# Patient Record
Sex: FEMALE | Race: OTHER | Employment: OTHER | URBAN - METROPOLITAN AREA
[De-identification: names, ages, dates, MRNs, and addresses within clinical notes are randomized per-mention and may not be internally consistent; named-entity substitution may affect disease eponyms.]

---

## 2017-05-01 ENCOUNTER — HOSPITAL ENCOUNTER (OUTPATIENT)
Dept: OTHER | Age: 72
Discharge: HOME | End: 2017-05-01
Attending: NURSE PRACTITIONER | Admitting: NURSE PRACTITIONER

## 2017-05-01 DIAGNOSIS — M25.552 LEFT HIP PAIN: ICD-10-CM

## 2017-07-10 ENCOUNTER — HOSPITAL ENCOUNTER (OUTPATIENT)
Dept: MAMMOGRAPHY | Age: 72
Discharge: HOME | End: 2017-07-10

## 2017-07-10 DIAGNOSIS — Z12.31 VISIT FOR SCREENING MAMMOGRAM: ICD-10-CM

## 2017-08-08 ENCOUNTER — HOSPITAL ENCOUNTER (OUTPATIENT)
Dept: OTHER | Age: 72
Discharge: HOME | End: 2017-08-08
Attending: INTERNAL MEDICINE | Admitting: INTERNAL MEDICINE

## 2017-08-08 LAB
ALBUMIN SERPL-MCNC: 4.3 G/DL (ref 3.4–5)
ANION GAP SERPL CALCULATED.3IONS-SCNC: 15 MMOL/L (ref 3–16)
BUN BLDV-MCNC: 17 MG/DL (ref 7–20)
CALCIUM SERPL-MCNC: 9.8 MG/DL (ref 8.3–10.6)
CHLORIDE BLD-SCNC: 95 MMOL/L (ref 99–110)
CO2: 27 MMOL/L (ref 21–32)
CREAT SERPL-MCNC: 1.1 MG/DL (ref 0.6–1.2)
GFR AFRICAN AMERICAN: 59
GFR NON-AFRICAN AMERICAN: 49
GLUCOSE BLD-MCNC: 91 MG/DL (ref 70–99)
PHOSPHORUS: 4.2 MG/DL (ref 2.5–4.9)
POTASSIUM SERPL-SCNC: 4.1 MMOL/L (ref 3.5–5.1)
SODIUM BLD-SCNC: 137 MMOL/L (ref 136–145)

## 2017-08-29 ENCOUNTER — HOSPITAL ENCOUNTER (OUTPATIENT)
Dept: OTHER | Age: 72
Discharge: HOME | End: 2017-08-29
Attending: PODIATRIST | Admitting: PODIATRIST

## 2017-08-29 LAB
ALBUMIN SERPL-MCNC: 4.1 G/DL (ref 3.4–5)
ALP BLD-CCNC: 61 U/L (ref 40–129)
ALT SERPL-CCNC: 16 U/L (ref 10–40)
AST SERPL-CCNC: 18 U/L (ref 15–37)
BILIRUB SERPL-MCNC: <0.2 MG/DL (ref 0–1)
BILIRUBIN DIRECT: <0.2 MG/DL (ref 0–0.3)
BILIRUBIN, INDIRECT: NORMAL MG/DL (ref 0–1)
GAMMA GLUTAMYL TRANSFERASE: 48 U/L (ref 5–36)
TOTAL PROTEIN: 7.4 G/DL (ref 6.4–8.2)

## 2017-10-19 ENCOUNTER — OFFICE VISIT (OUTPATIENT)
Dept: CARDIOLOGY CLINIC | Age: 72
End: 2017-10-19

## 2017-10-19 VITALS
SYSTOLIC BLOOD PRESSURE: 128 MMHG | WEIGHT: 139 LBS | BODY MASS INDEX: 24.62 KG/M2 | DIASTOLIC BLOOD PRESSURE: 82 MMHG | HEART RATE: 51 BPM

## 2017-10-19 DIAGNOSIS — I47.1 PAT (PAROXYSMAL ATRIAL TACHYCARDIA) (HCC): Primary | Chronic | ICD-10-CM

## 2017-10-19 DIAGNOSIS — I10 ESSENTIAL HYPERTENSION: Chronic | ICD-10-CM

## 2017-10-19 PROCEDURE — G8399 PT W/DXA RESULTS DOCUMENT: HCPCS | Performed by: NURSE PRACTITIONER

## 2017-10-19 PROCEDURE — 4040F PNEUMOC VAC/ADMIN/RCVD: CPT | Performed by: NURSE PRACTITIONER

## 2017-10-19 PROCEDURE — G8427 DOCREV CUR MEDS BY ELIG CLIN: HCPCS | Performed by: NURSE PRACTITIONER

## 2017-10-19 PROCEDURE — G8420 CALC BMI NORM PARAMETERS: HCPCS | Performed by: NURSE PRACTITIONER

## 2017-10-19 PROCEDURE — 99213 OFFICE O/P EST LOW 20 MIN: CPT | Performed by: NURSE PRACTITIONER

## 2017-10-19 PROCEDURE — 93000 ELECTROCARDIOGRAM COMPLETE: CPT | Performed by: NURSE PRACTITIONER

## 2017-10-19 PROCEDURE — 1090F PRES/ABSN URINE INCON ASSESS: CPT | Performed by: NURSE PRACTITIONER

## 2017-10-19 PROCEDURE — 3017F COLORECTAL CA SCREEN DOC REV: CPT | Performed by: NURSE PRACTITIONER

## 2017-10-19 PROCEDURE — 3014F SCREEN MAMMO DOC REV: CPT | Performed by: NURSE PRACTITIONER

## 2017-10-19 PROCEDURE — G8484 FLU IMMUNIZE NO ADMIN: HCPCS | Performed by: NURSE PRACTITIONER

## 2017-10-19 PROCEDURE — 1123F ACP DISCUSS/DSCN MKR DOCD: CPT | Performed by: NURSE PRACTITIONER

## 2017-10-19 PROCEDURE — 1036F TOBACCO NON-USER: CPT | Performed by: NURSE PRACTITIONER

## 2017-10-19 RX ORDER — NADOLOL 20 MG/1
TABLET ORAL
Qty: 30 TABLET | Refills: 10 | OUTPATIENT
Start: 2017-10-19

## 2017-10-19 RX ORDER — ATORVASTATIN CALCIUM 20 MG/1
20 TABLET, FILM COATED ORAL DAILY
COMMUNITY

## 2017-10-19 RX ORDER — NADOLOL 20 MG/1
10 TABLET ORAL DAILY
Qty: 15 TABLET | Refills: 11 | Status: SHIPPED | OUTPATIENT
Start: 2017-10-19 | End: 2018-11-01 | Stop reason: ALTCHOICE

## 2017-10-19 NOTE — PROGRESS NOTES
9/1/2011    ANKLE FRACTURE SURGERY Left 10-9/2015    BREAST ENHANCEMENT SURGERY      9/1/2011    CARPAL TUNNEL RELEASE      R    EYE SURGERY      Aug 19/2015    HYSTERECTOMY      total-pre cancerous cells    KNEE SURGERY      R    TUBAL LIGATION         Allergies: Allergies   Allergen Reactions    Morphine      40 yrs ago threw up after surgery don't know if it was morphine or the anesthesia       Social History:  Reviewed. reports that she quit smoking about 5 years ago. Her smoking use included Cigarettes. She smoked 0.00 packs per day. She has never used smokeless tobacco. She reports that she does not drink alcohol or use drugs. Family History:  Reviewed. family history includes Cancer in her mother; Stroke in her brother and father. Review of System:    · General ROS: negative for - chills, fever   · Psychological ROS: negative for - anxiety or depression  · Ophthalmic ROS: negative for - eye pain or loss of vision  · ENT ROS: negative for - headaches, sore throat   · Allergy and Immunology ROS: negative for - hives  · Hematological and Lymphatic ROS: negative for - bleeding problems, blood clots, bruising or jaundice  · Endocrine ROS: negative for - skin changes, temperature intolerance or unexpected weight changes  · Respiratory ROS: negative for - cough, sputum, wheezing  · Cardiovascular ROS: Per HPI.    · Gastrointestinal ROS: negative for - abdominal pain, diarrhea, nausea/vomiting, bleeding   · Genito-Urinary ROS: negative for - dysuria or incontinence  · Musculoskeletal ROS: negative for - joint swelling   · Neurological ROS: negative for - confusion, numbness/tingling, seizures, weakness  · Dermatological ROS: negative for - rash    Physical Examination:  Vitals:    10/19/17 1111   BP: 128/82   Pulse: 51       Constitutional and General Appearance: Warm and dry, no apparent distress, normal coloration  HEENT:  Normocephalic, atraumatic  Respiratory:  · Normal excursion and expansion without use of accessory muscles  · Resp Auscultation: Normal breath sounds without dullness  Cardiovascular:  · The apical impulses not displaced  · Heart tones are crisp and normal  · JVP less than 8 cm H2O  · Regular rate and rhythm, S1, S2  · Peripheral pulses are symmetrical and full  · There is no clubbing, cyanosis of the extremities. · No edema  · Pedal Pulses: 2+ and equal   Abdomen:  · No masses or tenderness  · Liver/Spleen: No Abnormalities Noted  Neurological/Psychiatric:  · Alert and oriented in all spheres  · Moves all extremities well  · Exhibits normal gait balance and coordination  · No abnormalities of mood, affect, memory, mentation, or behavior are noted    Labs:  Reviewed. Medication:  Current Outpatient Prescriptions   Medication Sig Dispense Refill    atorvastatin (LIPITOR) 20 MG tablet Take 20 mg by mouth daily      nadolol (CORGARD) 20 MG tablet Take 0.5 tablets by mouth daily 30 tablet 3    cetirizine (ZYRTEC) 10 MG tablet Take 10 mg by mouth daily      linaclotide (LINZESS) 145 MCG capsule Take 145 mcg by mouth every morning (before breakfast)      amLODIPine (NORVASC) 5 MG tablet       ferrous sulfate 325 (65 FE) MG tablet Take 325 mg by mouth daily (with breakfast)      Multiple Vitamins-Minerals (CENTRUM SILVER PO) Take by mouth      ibuprofen (ADVIL;MOTRIN) 800 MG tablet Take 1 tablet by mouth every 8 hours as needed for Pain 30 tablet 0    zolpidem (AMBIEN) 10 MG tablet TAKE ONE TABLET BY MOUTH EVERY NIGHT AT BEDTIME AS NEEDED FOR SLEEP 30 tablet 0    HYDROcodone-acetaminophen (NORCO)  MG per tablet One table every 6 hours as needed for pain. 100 tablet 0    diazepam (VALIUM) 5 MG tablet TAKE ONE TABLET BY MOUTH ONCE NIGHTLY AS NEEDED FOR ANXIETY 30 tablet 0    tiZANidine (ZANAFLEX) 4 MG tablet TAKE ONE TABLET BY MOUTH EVERY 8 HOURS AS NEEDED FOR MUSCLE RELAXER 90 tablet 2    Magnesium 400 MG CAPS Take 1 tablet by mouth.       Omega-3 350 MG CAPS Take 1 capsule by mouth daily. 30 capsule 3    Cholecalciferol (VITAMIN D3) 1000 UNITS TABS TAKE ONE TABLET BY MOUTH EVERY DAY 30 tablet 2    aspirin 81 MG tablet Take 81 mg by mouth daily. No current facility-administered medications for this visit. 1. PAT (paroxysmal atrial tachycardia) ([facility] [address]    2. Essential hypertension         Assessment and plan:   -PAT   -denies palpitations   -continue Nadolol     -HTN   -controlled    Thank you for allowing me to participate in the care of Iman 34. Further evaluation will be based upon the patient's clinical course and testing results. All questions and concerns were addressed to the patient/family. Alternatives to my treatment were discussed. I have discussed the above stated plan and the patient verbalized understanding and agreed with the plan.     Anuj Stone, [address]

## 2017-11-06 ENCOUNTER — HOSPITAL ENCOUNTER (OUTPATIENT)
Dept: OTHER | Age: 72
Discharge: HOME | End: 2017-11-06
Attending: PODIATRIST | Admitting: PODIATRIST

## 2017-11-06 LAB
ALBUMIN SERPL-MCNC: 4.5 G/DL (ref 3.4–5)
ALP BLD-CCNC: 62 U/L (ref 40–129)
ALT SERPL-CCNC: 12 U/L (ref 10–40)
AST SERPL-CCNC: 17 U/L (ref 15–37)
BILIRUB SERPL-MCNC: 0.3 MG/DL (ref 0–1)
BILIRUBIN DIRECT: <0.2 MG/DL (ref 0–0.3)
BILIRUBIN, INDIRECT: NORMAL MG/DL (ref 0–1)
GAMMA GLUTAMYL TRANSFERASE: 34 U/L (ref 5–36)
TOTAL PROTEIN: 8.2 G/DL (ref 6.4–8.2)

## 2018-08-22 ENCOUNTER — HOSPITAL ENCOUNTER (OUTPATIENT)
Dept: MAMMOGRAPHY | Age: 73
Discharge: HOME | End: 2018-08-22
Attending: INTERNAL MEDICINE | Admitting: INTERNAL MEDICINE

## 2018-08-22 DIAGNOSIS — Z12.31 VISIT FOR SCREENING MAMMOGRAM: ICD-10-CM

## 2018-11-01 ENCOUNTER — OFFICE VISIT (OUTPATIENT)
Dept: CARDIOLOGY CLINIC | Age: 73
End: 2018-11-01
Payer: MEDICARE

## 2018-11-01 VITALS
SYSTOLIC BLOOD PRESSURE: 104 MMHG | HEIGHT: 63 IN | DIASTOLIC BLOOD PRESSURE: 72 MMHG | HEART RATE: 65 BPM | WEIGHT: 146 LBS | BODY MASS INDEX: 25.87 KG/M2

## 2018-11-01 DIAGNOSIS — E78.00 HYPERCHOLESTEREMIA: ICD-10-CM

## 2018-11-01 DIAGNOSIS — I10 ESSENTIAL HYPERTENSION: Chronic | ICD-10-CM

## 2018-11-01 DIAGNOSIS — I47.1 PAT (PAROXYSMAL ATRIAL TACHYCARDIA) (HCC): Primary | Chronic | ICD-10-CM

## 2018-11-01 PROCEDURE — 4040F PNEUMOC VAC/ADMIN/RCVD: CPT | Performed by: INTERNAL MEDICINE

## 2018-11-01 PROCEDURE — 1090F PRES/ABSN URINE INCON ASSESS: CPT | Performed by: INTERNAL MEDICINE

## 2018-11-01 PROCEDURE — 99213 OFFICE O/P EST LOW 20 MIN: CPT | Performed by: INTERNAL MEDICINE

## 2018-11-01 PROCEDURE — 1123F ACP DISCUSS/DSCN MKR DOCD: CPT | Performed by: INTERNAL MEDICINE

## 2018-11-01 PROCEDURE — 93000 ELECTROCARDIOGRAM COMPLETE: CPT | Performed by: INTERNAL MEDICINE

## 2018-11-01 PROCEDURE — G8484 FLU IMMUNIZE NO ADMIN: HCPCS | Performed by: INTERNAL MEDICINE

## 2018-11-01 PROCEDURE — G8419 CALC BMI OUT NRM PARAM NOF/U: HCPCS | Performed by: INTERNAL MEDICINE

## 2018-11-01 PROCEDURE — 3017F COLORECTAL CA SCREEN DOC REV: CPT | Performed by: INTERNAL MEDICINE

## 2018-11-01 PROCEDURE — 1101F PT FALLS ASSESS-DOCD LE1/YR: CPT | Performed by: INTERNAL MEDICINE

## 2018-11-01 PROCEDURE — 1036F TOBACCO NON-USER: CPT | Performed by: INTERNAL MEDICINE

## 2018-11-01 PROCEDURE — G8399 PT W/DXA RESULTS DOCUMENT: HCPCS | Performed by: INTERNAL MEDICINE

## 2018-11-01 PROCEDURE — G8427 DOCREV CUR MEDS BY ELIG CLIN: HCPCS | Performed by: INTERNAL MEDICINE

## 2018-11-01 RX ORDER — GABAPENTIN 600 MG/1
600 TABLET ORAL DAILY
COMMUNITY
End: 2020-07-24 | Stop reason: ALTCHOICE

## 2018-11-01 NOTE — PROGRESS NOTES
rigidity. No JVD present. · Cardiovascular: Normal rate, regular rhythm, S1&S2. · Pulmonary/Chest: Bilateral respiratory sounds. No wheezes, No rhonchi. · Abdominal: Soft. Bowel sounds present. No distension, No tenderness. · Musculoskeletal: No tenderness. No edema    · Lymphadenopathy: Has no cervical adenopathy. · Neurological: Alert and oriented. Cranial nerve appears intact, No Gross deficit   · Skin: Skin is warm and dry. No rash noted. · Psychiatric: Has a normal behavior       Labs, diagnostic and imaging results reviewed. Reviewed. ECG:  Sinus 55, RBBB qtc 452  Echo: 12/22/15    Conclusions      Summary   Normal left ventricular global systolic function with an estimated ejection   fraction of 55-60%.   There is reversal of E/A inflow velocities across the mitral valve   suggesting impaired left ventricular relaxation.   Trace mitral valve regurgitation noted.   Trivial tricuspid valve regurgitation noted.   Trivial pulmonic valve regurgitation is present.       Medication:  Current Outpatient Prescriptions   Medication Sig Dispense Refill    gabapentin (NEURONTIN) 600 MG tablet Take 600 mg by mouth daily. Marianna Blair atorvastatin (LIPITOR) 20 MG tablet Take 20 mg by mouth daily      nadolol (CORGARD) 20 MG tablet Take 0.5 tablets by mouth daily 15 tablet 11    cetirizine (ZYRTEC) 10 MG tablet Take 10 mg by mouth daily      linaclotide (LINZESS) 145 MCG capsule Take 145 mcg by mouth every morning (before breakfast)      amLODIPine (NORVASC) 5 MG tablet       ferrous sulfate 325 (65 FE) MG tablet Take 325 mg by mouth daily (with breakfast)      Multiple Vitamins-Minerals (CENTRUM SILVER PO) Take by mouth      zolpidem (AMBIEN) 10 MG tablet TAKE ONE TABLET BY MOUTH EVERY NIGHT AT BEDTIME AS NEEDED FOR SLEEP 30 tablet 0    HYDROcodone-acetaminophen (NORCO)  MG per tablet One table every 6 hours as needed for pain.  100 tablet 0    diazepam (VALIUM) 5 MG tablet TAKE ONE TABLET BY

## 2018-11-15 ENCOUNTER — OFFICE VISIT (OUTPATIENT)
Dept: ORTHOPEDIC SURGERY | Age: 73
End: 2018-11-15
Payer: MEDICARE

## 2018-11-15 VITALS
SYSTOLIC BLOOD PRESSURE: 110 MMHG | HEIGHT: 63 IN | DIASTOLIC BLOOD PRESSURE: 88 MMHG | RESPIRATION RATE: 16 BRPM | HEART RATE: 68 BPM | WEIGHT: 145.94 LBS | BODY MASS INDEX: 25.86 KG/M2

## 2018-11-15 DIAGNOSIS — M25.561 ACUTE PAIN OF RIGHT KNEE: Primary | ICD-10-CM

## 2018-11-15 PROCEDURE — 1101F PT FALLS ASSESS-DOCD LE1/YR: CPT | Performed by: ORTHOPAEDIC SURGERY

## 2018-11-15 PROCEDURE — 1090F PRES/ABSN URINE INCON ASSESS: CPT | Performed by: ORTHOPAEDIC SURGERY

## 2018-11-15 PROCEDURE — G8399 PT W/DXA RESULTS DOCUMENT: HCPCS | Performed by: ORTHOPAEDIC SURGERY

## 2018-11-15 PROCEDURE — 3017F COLORECTAL CA SCREEN DOC REV: CPT | Performed by: ORTHOPAEDIC SURGERY

## 2018-11-15 PROCEDURE — 4040F PNEUMOC VAC/ADMIN/RCVD: CPT | Performed by: ORTHOPAEDIC SURGERY

## 2018-11-15 PROCEDURE — 1123F ACP DISCUSS/DSCN MKR DOCD: CPT | Performed by: ORTHOPAEDIC SURGERY

## 2018-11-15 PROCEDURE — 99203 OFFICE O/P NEW LOW 30 MIN: CPT | Performed by: ORTHOPAEDIC SURGERY

## 2018-11-15 PROCEDURE — G8419 CALC BMI OUT NRM PARAM NOF/U: HCPCS | Performed by: ORTHOPAEDIC SURGERY

## 2018-11-15 PROCEDURE — G8484 FLU IMMUNIZE NO ADMIN: HCPCS | Performed by: ORTHOPAEDIC SURGERY

## 2018-11-15 PROCEDURE — G8427 DOCREV CUR MEDS BY ELIG CLIN: HCPCS | Performed by: ORTHOPAEDIC SURGERY

## 2018-11-15 PROCEDURE — 1036F TOBACCO NON-USER: CPT | Performed by: ORTHOPAEDIC SURGERY

## 2018-11-15 NOTE — PROGRESS NOTES
ANXIETY 30 tablet 0    tiZANidine (ZANAFLEX) 4 MG tablet TAKE ONE TABLET BY MOUTH EVERY 8 HOURS AS NEEDED FOR MUSCLE RELAXER 90 tablet 2    Magnesium 400 MG CAPS Take 1 tablet by mouth.  Omega-3 350 MG CAPS Take 1 capsule by mouth daily. 30 capsule 3    Cholecalciferol (VITAMIN D3) 1000 UNITS TABS TAKE ONE TABLET BY MOUTH EVERY DAY 30 tablet 2    aspirin 81 MG tablet Take 81 mg by mouth daily. No current facility-administered medications for this visit. Allergies   Allergen Reactions    Morphine      40 yrs ago threw up after surgery don't know if it was morphine or the anesthesia       Review of Systems:  Pertinent items are noted in HPI. 13 point review of systems from Patient History Form dated 11/15/18 and available in the patient's chart under the Media tab. Physical Examination:     Vital signs:   /88   Pulse 68   Resp 16   Ht 5' 2.99\" (1.6 m)   Wt 145 lb 15.1 oz (66.2 kg)   BMI 25.86 kg/m²     General:  alert, appears stated age, cooperative and no distress   Gait:  Normal. The patient can bear weight on the injured extremity. Right Knee  Alignment:  neutral   ROM:  0 degrees extension to 120 degrees flexion   Bilateral knees   Crepitus:  no   Joint Tenderness:  anterior medial tibia and medial femoral condyle   Effusion:   0 cc   Patellar excursion:  2 of 4 quadrants    Patellar tilt test:  negative   Patellar facet tenderness:  negative medial   negative lateral   Trochlear tenderness:  negative   Anterior drawer test:  negative   Posterior drawer:   negative   Varus laxity at 30 degrees:  negative   Left knee: negative   Valgus laxity at 30 degrees:   negative   Left knee: negative   Bruce's test:  negative   Fatou's test:  negative     She has some bilateral mild ecchymosis in the anterior knee. Motor exam of the lower extremities show quadriceps, hamstrings, foot dorsiflexion and plantarflexion grossly intact.   Sensation to both feet is grossly

## 2018-11-19 ENCOUNTER — HOSPITAL ENCOUNTER (OUTPATIENT)
Age: 73
Discharge: HOME | End: 2018-11-19
Payer: MEDICARE

## 2018-11-19 ENCOUNTER — HOSPITAL ENCOUNTER (OUTPATIENT)
Dept: GENERAL RADIOLOGY | Age: 73
Discharge: HOME | End: 2018-11-19
Payer: MEDICARE

## 2018-11-19 DIAGNOSIS — M81.0 OSTEOPOROSIS, UNSPECIFIED OSTEOPOROSIS TYPE, UNSPECIFIED PATHOLOGICAL FRACTURE PRESENCE: ICD-10-CM

## 2018-11-19 LAB
ALBUMIN SERPL-MCNC: 4.2 G/DL (ref 3.4–5)
ANION GAP SERPL CALCULATED.3IONS-SCNC: 15 MMOL/L (ref 3–16)
BUN BLDV-MCNC: 19 MG/DL (ref 7–20)
CALCIUM SERPL-MCNC: 10.3 MG/DL (ref 8.3–10.6)
CHLORIDE BLD-SCNC: 105 MMOL/L (ref 99–110)
CO2: 26 MMOL/L (ref 21–32)
CREAT SERPL-MCNC: 1 MG/DL (ref 0.6–1.2)
GFR AFRICAN AMERICAN: >60
GFR NON-AFRICAN AMERICAN: 54
GLUCOSE BLD-MCNC: 203 MG/DL (ref 70–99)
MAGNESIUM: 2.4 MG/DL (ref 1.8–2.4)
PHOSPHORUS: 3.4 MG/DL (ref 2.5–4.9)
POTASSIUM SERPL-SCNC: 4.6 MMOL/L (ref 3.5–5.1)
SODIUM BLD-SCNC: 146 MMOL/L (ref 136–145)
VITAMIN D 25-HYDROXY: 62.4 NG/ML

## 2018-11-19 PROCEDURE — 36415 COLL VENOUS BLD VENIPUNCTURE: CPT

## 2018-11-19 PROCEDURE — 80069 RENAL FUNCTION PANEL: CPT

## 2018-11-19 PROCEDURE — 83735 ASSAY OF MAGNESIUM: CPT

## 2018-11-19 PROCEDURE — 77080 DXA BONE DENSITY AXIAL: CPT

## 2018-11-19 PROCEDURE — 82306 VITAMIN D 25 HYDROXY: CPT

## 2018-12-18 ENCOUNTER — HOSPITAL ENCOUNTER (OUTPATIENT)
Dept: VASCULAR LAB | Age: 73
Discharge: HOME | End: 2018-12-18
Payer: MEDICARE

## 2018-12-18 ENCOUNTER — HOSPITAL ENCOUNTER (OUTPATIENT)
Dept: NON INVASIVE DIAGNOSTICS | Age: 73
Discharge: HOME | End: 2018-12-18
Payer: MEDICARE

## 2018-12-18 DIAGNOSIS — R55 SYNCOPE, UNSPECIFIED SYNCOPE TYPE: Primary | ICD-10-CM

## 2018-12-18 LAB
LEFT VENTRICULAR EJECTION FRACTION HIGH VALUE: 60 %
LEFT VENTRICULAR EJECTION FRACTION MODE: NORMAL
LV EF: 60 %
LVEF MODALITY: NORMAL

## 2018-12-18 PROCEDURE — 93306 TTE W/DOPPLER COMPLETE: CPT

## 2018-12-18 PROCEDURE — 93880 EXTRACRANIAL BILAT STUDY: CPT

## 2019-01-20 ENCOUNTER — HOSPITAL ENCOUNTER (EMERGENCY)
Age: 74
Discharge: LEFT BEFORE DISCHARGE | End: 2019-01-20
Attending: EMERGENCY MEDICINE
Payer: MEDICARE

## 2019-01-20 VITALS
TEMPERATURE: 97.7 F | DIASTOLIC BLOOD PRESSURE: 75 MMHG | OXYGEN SATURATION: 97 % | RESPIRATION RATE: 18 BRPM | HEIGHT: 62 IN | SYSTOLIC BLOOD PRESSURE: 115 MMHG | HEART RATE: 77 BPM | WEIGHT: 142 LBS | BODY MASS INDEX: 26.13 KG/M2

## 2019-01-20 DIAGNOSIS — Z86.718 HISTORY OF THROMBOEMBOLISM: ICD-10-CM

## 2019-01-20 DIAGNOSIS — M17.31 POST-TRAUMATIC OSTEOARTHRITIS OF RIGHT KNEE: ICD-10-CM

## 2019-01-20 DIAGNOSIS — M79.89 PAIN AND SWELLING OF RIGHT LOWER EXTREMITY: Primary | ICD-10-CM

## 2019-01-20 DIAGNOSIS — M79.604 PAIN AND SWELLING OF RIGHT LOWER EXTREMITY: Primary | ICD-10-CM

## 2019-01-20 LAB
ANION GAP SERPL CALCULATED.3IONS-SCNC: 10 MMOL/L (ref 3–16)
BASOPHILS ABSOLUTE: 0 K/UL (ref 0–0.2)
BASOPHILS RELATIVE PERCENT: 0.8 %
BUN BLDV-MCNC: 21 MG/DL (ref 7–20)
CALCIUM SERPL-MCNC: 9.4 MG/DL (ref 8.3–10.6)
CHLORIDE BLD-SCNC: 108 MMOL/L (ref 99–110)
CO2: 24 MMOL/L (ref 21–32)
CREAT SERPL-MCNC: 1.3 MG/DL (ref 0.6–1.2)
EOSINOPHILS ABSOLUTE: 0.3 K/UL (ref 0–0.6)
EOSINOPHILS RELATIVE PERCENT: 6.1 %
GFR AFRICAN AMERICAN: 48
GFR NON-AFRICAN AMERICAN: 40
GLUCOSE BLD-MCNC: 148 MG/DL (ref 70–99)
HCT VFR BLD CALC: 37.9 % (ref 36–48)
HEMOGLOBIN: 12.6 G/DL (ref 12–16)
INR BLD: 1 (ref 0.86–1.14)
LYMPHOCYTES ABSOLUTE: 1.3 K/UL (ref 1–5.1)
LYMPHOCYTES RELATIVE PERCENT: 24.7 %
MCH RBC QN AUTO: 31.5 PG (ref 26–34)
MCHC RBC AUTO-ENTMCNC: 33.2 G/DL (ref 31–36)
MCV RBC AUTO: 94.9 FL (ref 80–100)
MONOCYTES ABSOLUTE: 0.3 K/UL (ref 0–1.3)
MONOCYTES RELATIVE PERCENT: 5.1 %
NEUTROPHILS ABSOLUTE: 3.3 K/UL (ref 1.7–7.7)
NEUTROPHILS RELATIVE PERCENT: 63.3 %
PDW BLD-RTO: 14.5 % (ref 12.4–15.4)
PLATELET # BLD: 176 K/UL (ref 135–450)
PMV BLD AUTO: 9.7 FL (ref 5–10.5)
POTASSIUM REFLEX MAGNESIUM: 4.1 MMOL/L (ref 3.5–5.1)
PROTHROMBIN TIME: 11.4 SEC (ref 9.8–13)
RBC # BLD: 3.99 M/UL (ref 4–5.2)
SODIUM BLD-SCNC: 142 MMOL/L (ref 136–145)
WBC # BLD: 5.3 K/UL (ref 4–11)

## 2019-01-20 PROCEDURE — 99283 EMERGENCY DEPT VISIT LOW MDM: CPT

## 2019-01-20 PROCEDURE — 85610 PROTHROMBIN TIME: CPT

## 2019-01-20 PROCEDURE — 85025 COMPLETE CBC W/AUTO DIFF WBC: CPT

## 2019-01-20 PROCEDURE — 80048 BASIC METABOLIC PNL TOTAL CA: CPT

## 2019-01-20 RX ORDER — ROPINIROLE 0.25 MG/1
0.25 TABLET, FILM COATED ORAL 3 TIMES DAILY
COMMUNITY
End: 2021-08-25

## 2019-01-20 ASSESSMENT — ENCOUNTER SYMPTOMS
SHORTNESS OF BREATH: 0
COUGH: 0
WHEEZING: 0
BACK PAIN: 0
DIARRHEA: 0
STRIDOR: 0
CHEST TIGHTNESS: 0
VOMITING: 0
COLOR CHANGE: 0
ABDOMINAL PAIN: 0
NAUSEA: 0

## 2019-01-20 ASSESSMENT — PAIN DESCRIPTION - PAIN TYPE: TYPE: ACUTE PAIN

## 2019-01-20 ASSESSMENT — PAIN DESCRIPTION - LOCATION: LOCATION: KNEE

## 2019-01-20 ASSESSMENT — PAIN SCALES - GENERAL: PAINLEVEL_OUTOF10: 1

## 2019-01-20 ASSESSMENT — PAIN DESCRIPTION - ORIENTATION: ORIENTATION: RIGHT

## 2019-01-21 ENCOUNTER — HOSPITAL ENCOUNTER (OUTPATIENT)
Dept: VASCULAR LAB | Age: 74
Discharge: HOME | End: 2019-01-21
Payer: MEDICARE

## 2019-01-21 DIAGNOSIS — M79.89 PAIN AND SWELLING OF RIGHT LOWER EXTREMITY: ICD-10-CM

## 2019-01-21 DIAGNOSIS — M79.604 PAIN AND SWELLING OF RIGHT LOWER EXTREMITY: ICD-10-CM

## 2019-01-21 DIAGNOSIS — M17.31 POST-TRAUMATIC OSTEOARTHRITIS OF RIGHT KNEE: ICD-10-CM

## 2019-01-21 DIAGNOSIS — Z86.718 HISTORY OF THROMBOEMBOLISM: ICD-10-CM

## 2019-01-21 PROCEDURE — 93971 EXTREMITY STUDY: CPT

## 2019-09-19 ENCOUNTER — HOSPITAL ENCOUNTER (OUTPATIENT)
Dept: WOMENS IMAGING | Age: 74
Discharge: HOME | End: 2019-09-19
Payer: MEDICARE

## 2019-09-19 DIAGNOSIS — Z12.31 ENCOUNTER FOR SCREENING MAMMOGRAM FOR BREAST CANCER: ICD-10-CM

## 2019-09-19 PROCEDURE — 77063 BREAST TOMOSYNTHESIS BI: CPT

## 2019-10-01 ENCOUNTER — HOSPITAL ENCOUNTER (OUTPATIENT)
Dept: MRI IMAGING | Age: 74
Discharge: HOME | End: 2019-10-01
Payer: MEDICARE

## 2019-10-01 DIAGNOSIS — M25.511 PAIN IN JOINT OF RIGHT SHOULDER: ICD-10-CM

## 2019-10-01 PROCEDURE — 73221 MRI JOINT UPR EXTREM W/O DYE: CPT

## 2019-10-09 ENCOUNTER — ANESTHESIA EVENT (OUTPATIENT)
Dept: ENDOSCOPY | Age: 74
End: 2019-10-09
Payer: MEDICARE

## 2019-10-09 ENCOUNTER — HOSPITAL ENCOUNTER (OUTPATIENT)
Age: 74
Setting detail: OUTPATIENT SURGERY
Discharge: HOME | End: 2019-10-09
Attending: INTERNAL MEDICINE | Admitting: INTERNAL MEDICINE
Payer: MEDICARE

## 2019-10-09 ENCOUNTER — ANESTHESIA (OUTPATIENT)
Dept: ENDOSCOPY | Age: 74
End: 2019-10-09
Payer: MEDICARE

## 2019-10-09 VITALS
HEART RATE: 73 BPM | RESPIRATION RATE: 18 BRPM | WEIGHT: 135 LBS | SYSTOLIC BLOOD PRESSURE: 148 MMHG | BODY MASS INDEX: 23.92 KG/M2 | OXYGEN SATURATION: 99 % | DIASTOLIC BLOOD PRESSURE: 84 MMHG | TEMPERATURE: 96.9 F | HEIGHT: 63 IN

## 2019-10-09 VITALS — OXYGEN SATURATION: 99 % | SYSTOLIC BLOOD PRESSURE: 87 MMHG | DIASTOLIC BLOOD PRESSURE: 43 MMHG

## 2019-10-09 PROCEDURE — 3609010600 HC COLONOSCOPY POLYPECTOMY SNARE/COLD BIOPSY: Performed by: INTERNAL MEDICINE

## 2019-10-09 PROCEDURE — 6360000002 HC RX W HCPCS: Performed by: NURSE ANESTHETIST, CERTIFIED REGISTERED

## 2019-10-09 PROCEDURE — 7100000010 HC PHASE II RECOVERY - FIRST 15 MIN: Performed by: INTERNAL MEDICINE

## 2019-10-09 PROCEDURE — 3609010300 HC COLONOSCOPY W/BIOPSY SINGLE/MULTIPLE: Performed by: INTERNAL MEDICINE

## 2019-10-09 PROCEDURE — 88305 TISSUE EXAM BY PATHOLOGIST: CPT

## 2019-10-09 PROCEDURE — 3700000000 HC ANESTHESIA ATTENDED CARE: Performed by: INTERNAL MEDICINE

## 2019-10-09 PROCEDURE — 2580000003 HC RX 258: Performed by: INTERNAL MEDICINE

## 2019-10-09 PROCEDURE — 3700000001 HC ADD 15 MINUTES (ANESTHESIA): Performed by: INTERNAL MEDICINE

## 2019-10-09 PROCEDURE — 7100000011 HC PHASE II RECOVERY - ADDTL 15 MIN: Performed by: INTERNAL MEDICINE

## 2019-10-09 PROCEDURE — 2500000003 HC RX 250 WO HCPCS: Performed by: NURSE ANESTHETIST, CERTIFIED REGISTERED

## 2019-10-09 PROCEDURE — 2709999900 HC NON-CHARGEABLE SUPPLY: Performed by: INTERNAL MEDICINE

## 2019-10-09 RX ORDER — LIDOCAINE HYDROCHLORIDE 20 MG/ML
INJECTION, SOLUTION EPIDURAL; INFILTRATION; INTRACAUDAL; PERINEURAL PRN
Status: DISCONTINUED | OUTPATIENT
Start: 2019-10-09 | End: 2019-10-09 | Stop reason: SDUPTHER

## 2019-10-09 RX ORDER — TIZANIDINE 4 MG/1
4 TABLET ORAL EVERY 6 HOURS PRN
COMMUNITY

## 2019-10-09 RX ORDER — SODIUM CHLORIDE 9 MG/ML
INJECTION, SOLUTION INTRAVENOUS CONTINUOUS
Status: DISCONTINUED | OUTPATIENT
Start: 2019-10-09 | End: 2019-10-09 | Stop reason: HOSPADM

## 2019-10-09 RX ORDER — PHENOL 1.4 %
1 AEROSOL, SPRAY (ML) MUCOUS MEMBRANE DAILY
COMMUNITY
End: 2019-11-14 | Stop reason: SDUPTHER

## 2019-10-09 RX ORDER — PROPOFOL 10 MG/ML
INJECTION, EMULSION INTRAVENOUS PRN
Status: DISCONTINUED | OUTPATIENT
Start: 2019-10-09 | End: 2019-10-09 | Stop reason: SDUPTHER

## 2019-10-09 RX ADMIN — LIDOCAINE HYDROCHLORIDE 20 MG: 20 INJECTION, SOLUTION EPIDURAL; INFILTRATION; INTRACAUDAL; PERINEURAL at 12:49

## 2019-10-09 RX ADMIN — PROPOFOL 40 MG: 10 INJECTION, EMULSION INTRAVENOUS at 13:12

## 2019-10-09 RX ADMIN — PROPOFOL 80 MG: 10 INJECTION, EMULSION INTRAVENOUS at 12:46

## 2019-10-09 RX ADMIN — LIDOCAINE HYDROCHLORIDE 20 MG: 20 INJECTION, SOLUTION EPIDURAL; INFILTRATION; INTRACAUDAL; PERINEURAL at 12:52

## 2019-10-09 RX ADMIN — PROPOFOL 30 MG: 10 INJECTION, EMULSION INTRAVENOUS at 13:18

## 2019-10-09 RX ADMIN — PROPOFOL 40 MG: 10 INJECTION, EMULSION INTRAVENOUS at 12:56

## 2019-10-09 RX ADMIN — PROPOFOL 30 MG: 10 INJECTION, EMULSION INTRAVENOUS at 13:02

## 2019-10-09 RX ADMIN — PROPOFOL 40 MG: 10 INJECTION, EMULSION INTRAVENOUS at 12:52

## 2019-10-09 RX ADMIN — PROPOFOL 40 MG: 10 INJECTION, EMULSION INTRAVENOUS at 12:49

## 2019-10-09 RX ADMIN — PROPOFOL 30 MG: 10 INJECTION, EMULSION INTRAVENOUS at 13:08

## 2019-10-09 RX ADMIN — PROPOFOL 30 MG: 10 INJECTION, EMULSION INTRAVENOUS at 13:22

## 2019-10-09 RX ADMIN — PROPOFOL 30 MG: 10 INJECTION, EMULSION INTRAVENOUS at 13:01

## 2019-10-09 RX ADMIN — PROPOFOL 30 MG: 10 INJECTION, EMULSION INTRAVENOUS at 13:14

## 2019-10-09 RX ADMIN — LIDOCAINE HYDROCHLORIDE 40 MG: 20 INJECTION, SOLUTION EPIDURAL; INFILTRATION; INTRACAUDAL; PERINEURAL at 12:46

## 2019-10-09 RX ADMIN — SODIUM CHLORIDE: 9 INJECTION, SOLUTION INTRAVENOUS at 11:41

## 2019-10-09 RX ADMIN — PROPOFOL 40 MG: 10 INJECTION, EMULSION INTRAVENOUS at 12:59

## 2019-10-09 ASSESSMENT — COPD QUESTIONNAIRES: CAT_SEVERITY: MODERATE

## 2019-10-09 ASSESSMENT — PAIN SCALES - GENERAL
PAINLEVEL_OUTOF10: 0

## 2019-10-09 ASSESSMENT — PAIN - FUNCTIONAL ASSESSMENT: PAIN_FUNCTIONAL_ASSESSMENT: 0-10

## 2019-11-14 ENCOUNTER — OFFICE VISIT (OUTPATIENT)
Dept: CARDIOLOGY CLINIC | Age: 74
End: 2019-11-14
Payer: MEDICARE

## 2019-11-14 VITALS
HEART RATE: 79 BPM | DIASTOLIC BLOOD PRESSURE: 67 MMHG | SYSTOLIC BLOOD PRESSURE: 101 MMHG | BODY MASS INDEX: 25.16 KG/M2 | WEIGHT: 142 LBS | HEIGHT: 63 IN

## 2019-11-14 DIAGNOSIS — E78.2 MIXED HYPERLIPIDEMIA: ICD-10-CM

## 2019-11-14 DIAGNOSIS — I10 ESSENTIAL HYPERTENSION: ICD-10-CM

## 2019-11-14 DIAGNOSIS — I47.1 PAT (PAROXYSMAL ATRIAL TACHYCARDIA) (HCC): Primary | ICD-10-CM

## 2019-11-14 PROCEDURE — 93000 ELECTROCARDIOGRAM COMPLETE: CPT | Performed by: INTERNAL MEDICINE

## 2019-11-14 PROCEDURE — 1123F ACP DISCUSS/DSCN MKR DOCD: CPT | Performed by: INTERNAL MEDICINE

## 2019-11-14 PROCEDURE — 1090F PRES/ABSN URINE INCON ASSESS: CPT | Performed by: INTERNAL MEDICINE

## 2019-11-14 PROCEDURE — G8427 DOCREV CUR MEDS BY ELIG CLIN: HCPCS | Performed by: INTERNAL MEDICINE

## 2019-11-14 PROCEDURE — G8484 FLU IMMUNIZE NO ADMIN: HCPCS | Performed by: INTERNAL MEDICINE

## 2019-11-14 PROCEDURE — 3017F COLORECTAL CA SCREEN DOC REV: CPT | Performed by: INTERNAL MEDICINE

## 2019-11-14 PROCEDURE — G8399 PT W/DXA RESULTS DOCUMENT: HCPCS | Performed by: INTERNAL MEDICINE

## 2019-11-14 PROCEDURE — 4040F PNEUMOC VAC/ADMIN/RCVD: CPT | Performed by: INTERNAL MEDICINE

## 2019-11-14 PROCEDURE — 99213 OFFICE O/P EST LOW 20 MIN: CPT | Performed by: INTERNAL MEDICINE

## 2019-11-14 PROCEDURE — G8417 CALC BMI ABV UP PARAM F/U: HCPCS | Performed by: INTERNAL MEDICINE

## 2019-11-14 PROCEDURE — 1036F TOBACCO NON-USER: CPT | Performed by: INTERNAL MEDICINE

## 2019-11-14 RX ORDER — PHENOL 1.4 %
1 AEROSOL, SPRAY (ML) MUCOUS MEMBRANE DAILY
Qty: 90 TABLET | Refills: 3
Start: 2019-11-14

## 2020-01-07 ENCOUNTER — OFFICE VISIT (OUTPATIENT)
Dept: ORTHOPEDIC SURGERY | Age: 75
End: 2020-01-07
Payer: MEDICARE

## 2020-01-07 VITALS
HEIGHT: 63 IN | WEIGHT: 137 LBS | DIASTOLIC BLOOD PRESSURE: 66 MMHG | HEART RATE: 72 BPM | BODY MASS INDEX: 24.27 KG/M2 | SYSTOLIC BLOOD PRESSURE: 100 MMHG

## 2020-01-07 PROBLEM — M75.121 NONTRAUMATIC COMPLETE TEAR OF RIGHT ROTATOR CUFF: Status: ACTIVE | Noted: 2020-01-07

## 2020-01-07 PROCEDURE — 99214 OFFICE O/P EST MOD 30 MIN: CPT | Performed by: ORTHOPAEDIC SURGERY

## 2020-01-07 PROCEDURE — 1123F ACP DISCUSS/DSCN MKR DOCD: CPT | Performed by: ORTHOPAEDIC SURGERY

## 2020-01-07 PROCEDURE — G8427 DOCREV CUR MEDS BY ELIG CLIN: HCPCS | Performed by: ORTHOPAEDIC SURGERY

## 2020-01-07 PROCEDURE — 1090F PRES/ABSN URINE INCON ASSESS: CPT | Performed by: ORTHOPAEDIC SURGERY

## 2020-01-07 PROCEDURE — G8420 CALC BMI NORM PARAMETERS: HCPCS | Performed by: ORTHOPAEDIC SURGERY

## 2020-01-07 PROCEDURE — G8399 PT W/DXA RESULTS DOCUMENT: HCPCS | Performed by: ORTHOPAEDIC SURGERY

## 2020-01-07 PROCEDURE — 1036F TOBACCO NON-USER: CPT | Performed by: ORTHOPAEDIC SURGERY

## 2020-01-07 PROCEDURE — G8484 FLU IMMUNIZE NO ADMIN: HCPCS | Performed by: ORTHOPAEDIC SURGERY

## 2020-01-07 PROCEDURE — 4040F PNEUMOC VAC/ADMIN/RCVD: CPT | Performed by: ORTHOPAEDIC SURGERY

## 2020-01-07 PROCEDURE — 3017F COLORECTAL CA SCREEN DOC REV: CPT | Performed by: ORTHOPAEDIC SURGERY

## 2020-01-08 ENCOUNTER — HOSPITAL ENCOUNTER (OUTPATIENT)
Dept: ULTRASOUND IMAGING | Age: 75
Discharge: HOME | End: 2020-01-08
Payer: MEDICARE

## 2020-01-08 PROCEDURE — 76536 US EXAM OF HEAD AND NECK: CPT

## 2020-02-27 ENCOUNTER — OFFICE VISIT (OUTPATIENT)
Dept: ENDOCRINOLOGY | Age: 75
End: 2020-02-27
Payer: MEDICARE

## 2020-02-27 VITALS
WEIGHT: 136 LBS | RESPIRATION RATE: 14 BRPM | HEIGHT: 62 IN | HEART RATE: 73 BPM | DIASTOLIC BLOOD PRESSURE: 70 MMHG | BODY MASS INDEX: 25.03 KG/M2 | SYSTOLIC BLOOD PRESSURE: 110 MMHG | OXYGEN SATURATION: 90 %

## 2020-02-27 PROCEDURE — 1090F PRES/ABSN URINE INCON ASSESS: CPT | Performed by: INTERNAL MEDICINE

## 2020-02-27 PROCEDURE — G8420 CALC BMI NORM PARAMETERS: HCPCS | Performed by: INTERNAL MEDICINE

## 2020-02-27 PROCEDURE — 4040F PNEUMOC VAC/ADMIN/RCVD: CPT | Performed by: INTERNAL MEDICINE

## 2020-02-27 PROCEDURE — 99203 OFFICE O/P NEW LOW 30 MIN: CPT | Performed by: INTERNAL MEDICINE

## 2020-02-27 PROCEDURE — 3023F SPIROM DOC REV: CPT | Performed by: INTERNAL MEDICINE

## 2020-02-27 PROCEDURE — 3017F COLORECTAL CA SCREEN DOC REV: CPT | Performed by: INTERNAL MEDICINE

## 2020-02-27 PROCEDURE — 1123F ACP DISCUSS/DSCN MKR DOCD: CPT | Performed by: INTERNAL MEDICINE

## 2020-02-27 PROCEDURE — G8926 SPIRO NO PERF OR DOC: HCPCS | Performed by: INTERNAL MEDICINE

## 2020-02-27 PROCEDURE — G8399 PT W/DXA RESULTS DOCUMENT: HCPCS | Performed by: INTERNAL MEDICINE

## 2020-02-27 PROCEDURE — G8484 FLU IMMUNIZE NO ADMIN: HCPCS | Performed by: INTERNAL MEDICINE

## 2020-02-27 PROCEDURE — 1036F TOBACCO NON-USER: CPT | Performed by: INTERNAL MEDICINE

## 2020-02-27 PROCEDURE — G8427 DOCREV CUR MEDS BY ELIG CLIN: HCPCS | Performed by: INTERNAL MEDICINE

## 2020-03-02 ENCOUNTER — TELEPHONE (OUTPATIENT)
Dept: ENDOCRINOLOGY | Age: 75
End: 2020-03-02

## 2020-03-02 NOTE — TELEPHONE ENCOUNTER
Please advise patient that her thyroid function tests were all within normal limits she does not need to take any medication for thyroid hormone levels.   I would recommend continuing with the thyroid ultrasound as discussed during the office visit

## 2020-03-03 ENCOUNTER — HOSPITAL ENCOUNTER (OUTPATIENT)
Dept: NON INVASIVE DIAGNOSTICS | Age: 75
Discharge: HOME | End: 2020-03-03
Payer: MEDICARE

## 2020-03-03 LAB
LV EF: 60 %
LVEF MODALITY: NORMAL

## 2020-03-03 PROCEDURE — 3430000000 HC RX DIAGNOSTIC RADIOPHARMACEUTICAL: Performed by: INTERNAL MEDICINE

## 2020-03-03 PROCEDURE — A9502 TC99M TETROFOSMIN: HCPCS | Performed by: INTERNAL MEDICINE

## 2020-03-03 PROCEDURE — 78452 HT MUSCLE IMAGE SPECT MULT: CPT

## 2020-03-03 PROCEDURE — 93017 CV STRESS TEST TRACING ONLY: CPT | Performed by: INTERNAL MEDICINE

## 2020-03-03 RX ADMIN — TETROFOSMIN 30 MILLICURIE: 1.38 INJECTION, POWDER, LYOPHILIZED, FOR SOLUTION INTRAVENOUS at 14:15

## 2020-03-03 RX ADMIN — TETROFOSMIN 10 MILLICURIE: 1.38 INJECTION, POWDER, LYOPHILIZED, FOR SOLUTION INTRAVENOUS at 13:10

## 2020-06-05 ENCOUNTER — OFFICE VISIT (OUTPATIENT)
Dept: ENT CLINIC | Age: 75
End: 2020-06-05
Payer: MEDICARE

## 2020-06-05 VITALS
HEART RATE: 70 BPM | DIASTOLIC BLOOD PRESSURE: 89 MMHG | BODY MASS INDEX: 23.04 KG/M2 | HEIGHT: 63 IN | TEMPERATURE: 97.1 F | WEIGHT: 130 LBS | SYSTOLIC BLOOD PRESSURE: 120 MMHG

## 2020-06-05 PROBLEM — R09.A2 GLOBUS PHARYNGEUS: Status: ACTIVE | Noted: 2020-06-05

## 2020-06-05 PROBLEM — H93.13 SUBJECTIVE TINNITUS OF BOTH EARS: Status: ACTIVE | Noted: 2020-06-05

## 2020-06-05 PROBLEM — R09.89 GLOBUS PHARYNGEUS: Status: ACTIVE | Noted: 2020-06-05

## 2020-06-05 PROCEDURE — 1036F TOBACCO NON-USER: CPT | Performed by: OTOLARYNGOLOGY

## 2020-06-05 PROCEDURE — 1123F ACP DISCUSS/DSCN MKR DOCD: CPT | Performed by: OTOLARYNGOLOGY

## 2020-06-05 PROCEDURE — 4040F PNEUMOC VAC/ADMIN/RCVD: CPT | Performed by: OTOLARYNGOLOGY

## 2020-06-05 PROCEDURE — 3017F COLORECTAL CA SCREEN DOC REV: CPT | Performed by: OTOLARYNGOLOGY

## 2020-06-05 PROCEDURE — 99203 OFFICE O/P NEW LOW 30 MIN: CPT | Performed by: OTOLARYNGOLOGY

## 2020-06-05 PROCEDURE — 1090F PRES/ABSN URINE INCON ASSESS: CPT | Performed by: OTOLARYNGOLOGY

## 2020-06-05 PROCEDURE — G8420 CALC BMI NORM PARAMETERS: HCPCS | Performed by: OTOLARYNGOLOGY

## 2020-06-05 PROCEDURE — G8427 DOCREV CUR MEDS BY ELIG CLIN: HCPCS | Performed by: OTOLARYNGOLOGY

## 2020-06-05 PROCEDURE — G8399 PT W/DXA RESULTS DOCUMENT: HCPCS | Performed by: OTOLARYNGOLOGY

## 2020-06-05 NOTE — PROGRESS NOTES
of pulmonary embolism 6/13/2012    Hx of blood clots     Hypercholesteremia     Hypertension     Hypomagnesemia 6/13/2012    Increased MCV 6/13/2012    Insomnia secondary to situational depression 12/12/2012    Left sided abdominal pain 11/13/2013    Lumbar strain 3/27/2013    Muscle spasm     chronic    PE (pulmonary thromboembolism) 10/25/2011    Pulmonary embolism ([address]     Trigger finger 11/12/2013         Past Surgical History:   Procedure Laterality Date    ABDOMEN SURGERY      tummy tuck    ABDOMINOPLASTY  9/1/2011    ANKLE FRACTURE SURGERY Left 10-9/2015    BREAST ENHANCEMENT SURGERY      9/1/2011    CARPAL TUNNEL RELEASE      R    COLONOSCOPY N/A 10/9/2019    COLONOSCOPY POLYPECTOMY SNARE/COLD BIOPSY performed by Robert Ferguson MD at [address]  10/9/2019    COLONOSCOPY WITH BIOPSY performed by Robert Ferguson MD at [address]      Aug 19/2015    HYSTERECTOMY      total-pre cancerous cells    KNEE SURGERY      R    TUBAL LIGATION             EXAMINATION:       Vitals:    06/05/20 1622   BP: 120/89   Pulse: 70   Temp: 97.1 °F (36.2 °C)   Weight: 130 lb (59 kg)   Height: 5' 2.5\" (1.588 m)     VITALS SIGNS were reviewed. GENERAL APPEARANCE: WDWN NAD, Alert and oriented X 3. ABILITY TO COMMUNICATE/QUALITY OF VOICE:  Normal, no hoarseness or hot potato quality. SALIVARY GLANDS:  Parotid gland and submandibular gland normal bilaterally. INSPECTION, HEAD AND FACE:  Normal with no masses, lesions, tenderness, or deformities. FACIAL STRENGTH, MOTION:  Facial nerve function intact and equal bilaterally for all 5 branches. SINUSES:  Frontal sinuses and maxillary sinuses nontender, bilaterally. HEARING ASSESSMENT:  Hearing was intact to finger rub at the external meatus bilaterally. Tuning fork tests, using a 512 Hz tuning fork, showed the Kay test was heard in the midline.   The Rinne test showed air conduction greater than bone conduction bilaterally. EXTERNAL EAR/NOSE:  The pinnae, mastoids, and external nose were normal bilaterally. EARS, OTOSCOPY:  The tympanic membranes and external auditory canals were normal bilaterally. NOSE:  The nasal septum was midline. The inferior turbinates were normal bilaterally. Nasal mucosa and nasal secretions were normal bilaterally. LIPS, TEETH AND GUMS:  Normal.  OROPHARYNX/ORAL CAVITY:  Normal mucosa with no ulcerations, masses, lesions, erythema, exudate, or other abnormalities. INDIRECT LARYNGOSCOPY:  Visualization was suboptimal due to gag reflex and guarding. However, the vocal cords appeared to be normally mobile bilaterally with midline approximation on phonation and no lesions. The epiglottis, supraglottis, false vocal cords, base of tongue, subglottis, and piriform sinuses appeared to be normal.    NECK:  Normal with no masses, tenderness, or tracheal deviation, and with normal laryngeal crepitus. THYROID:  Left thyroid goiter, mass, or nodule. Right not palpable  Normal, with no nodules, goiter, or tenderness bilaterally. LYMPH NODES, CERVICAL, FACIAL AND SUPRACLAVICULAR:  No lymphadenopathy detected. Suresh Cordoba / Bria Root / Dileep Pinon:       Feliberto Krabbe was seen today for dysphagia. Diagnoses and all orders for this visit:    Globus pharyngeus    Subjective tinnitus of both ears         RECOMMENDATIONS/PLAN:    1. Audiogram.    2. Review results at next visit. 3. Consider modified barium swallow after flex. 4. Return for flexible fiberoptic nasopharyngolaryngoscopy, recheck/follow-up, and sooner if condition worsens.

## 2020-07-13 ENCOUNTER — OFFICE VISIT (OUTPATIENT)
Dept: ENT CLINIC | Age: 75
End: 2020-07-13
Payer: MEDICARE

## 2020-07-13 ENCOUNTER — TELEPHONE (OUTPATIENT)
Dept: CARDIOLOGY CLINIC | Age: 75
End: 2020-07-13

## 2020-07-13 VITALS
BODY MASS INDEX: 23.04 KG/M2 | WEIGHT: 130 LBS | DIASTOLIC BLOOD PRESSURE: 82 MMHG | HEIGHT: 63 IN | SYSTOLIC BLOOD PRESSURE: 126 MMHG | HEART RATE: 76 BPM | TEMPERATURE: 97.8 F

## 2020-07-13 PROCEDURE — 31575 DIAGNOSTIC LARYNGOSCOPY: CPT | Performed by: OTOLARYNGOLOGY

## 2020-07-13 RX ORDER — OMEPRAZOLE 20 MG/1
CAPSULE, DELAYED RELEASE ORAL
Qty: 180 CAPSULE | Refills: 0 | Status: SHIPPED | OUTPATIENT
Start: 2020-07-13 | End: 2021-08-25

## 2020-07-13 NOTE — PROGRESS NOTES
Chief Complaint   Patient presents with    Dysphagia     globus pharyngeus       PROCEDURE:  FLEXIBLE FIBEROPTIC NASAL AND NASOPHARYNGOLARYNGOSCOPY  INDICATION:  Need for detailed endoscopic examination of the larynx and pharynx to evaluate the upper aerodigestive tract for etiology of sensation of difficulty swallowing at night. See prior note for history. INFORMED CONSENT:  The procedure was described to the patient, including method of anesthesia. The patient was advised of the medical necessity for this procedure. The risks and potential complications were discussed, including, but not limited to, bleeding, infection, adverse reaction to medications, hoarseness, sore throat, inability to obtain adequate visualization, and future need for rigid operative endoscopy. The expected outcome, potential benefits and the alternatives of therapy were discussed. Ranjith Hedrick asked appropriate questions and then expressed the lack of any further questions, understanding, acceptance, and the desire to undergo with this procedure, granting verbal informed consent. FINDINGS:  Mass/cyst on left side of epiglottis, with smooth overlying mucosa. There was mild edema and erythema of the arytenoid and interarytenoid mucosa consistent with posterior laryngitis secondary to laryngopharyngeal reflux. The vocal cords appeared to be normal, with no nodule, ulceration, polyp, leukoplakia or other lesions. The vocal cords appeared to be normally mobile bilaterally with midline approximation on phonation. Sensation of the hypopharynx and larynx appeared to be normal when touched by the end of the flexible scope. The nasopharynx, eustachian tube orifices and fossa of Rosenmüller, oropharynx, base of tongue, hypopharynx, supraglottis, subglottis, and piriform sinuses all appeared to be normal, with no lesions. Visualization was excellent throughout the examination.        DESCRIPTION OF PROCEDURE:  The right and left nasal cavity was topically anesthetized and decongested with a 50-50 mixture of 0.05% oxymetazoline solution and topical 4% lidocaine solution by nasal sprayer, twice. After about ten minutes, the flexible fiberoptic nasopharyngolaryngoscope, with camera attached, was inserted through the right nare/nasal cavity and advanced to the nasopharynx and then to the hypopharynx and larynx. The [employer] video system was used. After adequate endoscopic visualization, the endoscope was removed. The patient appeared to tolerate the procedure well with no evidence of perioperative complications. Molly Case / Guanakito Merino / Natty Hood / Raymond Coxi was seen today for dysphagia. Diagnoses and all orders for this visit:    LPRD (laryngopharyngeal reflux disease)  -     omeprazole (PRILOSEC) 20 MG delayed release capsule; Take 1 capsule by mouth 2 times daily; take on an empty stomach and eat a meal or snack 45 to 60 minutes hour after each dose. Chronic laryngitis  -     omeprazole (PRILOSEC) 20 MG delayed release capsule; Take 1 capsule by mouth 2 times daily; take on an empty stomach and eat a meal or snack 45 to 60 minutes hour after each dose. Globus pharyngeus  -     omeprazole (PRILOSEC) 20 MG delayed release capsule; Take 1 capsule by mouth 2 times daily; take on an empty stomach and eat a meal or snack 45 to 60 minutes hour after each dose. Mass of epiglottis  Comments:  benign versus malignant         RECOMMENDATIONS / PLAN    1. Direct laryngoscopy and excision or biopsy of mass/cyst of epiglottis and any other abnormality detected. 2. See Patient Instructions on file for this visit. 3. Return for post op check.

## 2020-07-13 NOTE — TELEPHONE ENCOUNTER
CARDIAC CLEARANCE     What type of procedure are you having? Having nodule on neck removed  Which physician is performing your procedure? Dr Alyssa Sandhu  When is your procedure scheduled for?  7/28/20  Where are you having this procedure? [facility]  Are you taking Blood Thinners? If so what? (Name/dose/frequesncy)  Baby Aspirin   Does the surgeon want you to stop your blood thinner? If so for how long?   A week before surger  Phone Number and Contact Name for Physicians office:  Dr Alyssa Sandhu - [phone]  Fax number to send information:    [phone] or [phone]

## 2020-07-13 NOTE — PATIENT INSTRUCTIONS
· I recommend direct laryngoscopy and biopsy or excision of the nodule on the epiglottis and biopsy of any other abnormalities detected. This is a surgical procedure done under general anesthesia, in the operating room as same day surgery. · In regard to your hearing loss, you should consider amplification therapy, hearing aid, if and when you are having difficulty communicating and/or hearing important sounds and feel a need to hear better. I recommend bilateral hearing aids for aural rehabilitation and to aid in restoring functional hearing. · You should return for an annual hearing test to monitor your hearing, and whenever your hearing further decreases significantly. · You should employ the tinnitus masking techniques and strategies we discussed, as needed. Bedside tinnitus masking devices (eg. a white noise machine, noise machine, noise alysha on your cell phone, fan on in the room, radio with light music or tuned between stations to white noise static) can be very helpful. · You should avoid exposure to excessively high levels of noise/sound and use hearing protection measures we discussed, as needed, if such exposure is unavoidable. · I recommend that you use Lipoflavonoid, (use brand name, not generic, for the first six months), for treatment of your tinnitus. This is available \"over the counter\" at your pharmacy. You should take two orally three times a day for the first 60 days, then one orally three times a day thereafter. Take this medication continuously for six months. If you have seen no improvement in your tinnitus after a full six months of use, you should consider cessation of this treatment. · NO Q-TIPS IN THE EARS  You should never clean your ears with a Q-tip, cotton tipped applicator, Hafsa pin, paper clip, or any other instrument.   I recommend only use of one the several ear wax removal kits available \"over the counter\" (eg. [employer] or Murine, or [employer]) if you feel a need to try to remove ear wax. No other methods should be self used for this purpose as there is danger of injury to the ear and risk of irreparable and irreversible permanent hearing loss.     ==================================================================    Laryngopharyngeal Reflux    Laryngopharyngeal reflux (LPR) is a condition in which stomach fluid refluxes, or flows backwards, up into your throat. This affects the back area of your voice box. It happens hundreds of times a day and involves a very small amount of fluid each time. There is no sensation or awareness of this reflux, such as the heartburn, pain, or indigestion associated with a related condition, gastroesophageal reflux, or GERD. These two conditions are distinctly different, although they may coexist in a given person. Generally, you will not be aware when LPR is happening. However, the stomach fluid contains an enzyme, called pepsin, which causes inflammation in your throat, which in turn, causes your symptoms. The most common symptoms associated with LPR reflux are a sensation of a lump in the throat or mucus that you cannot clear or something stuck in the throat, chronic cough, chronic hoarseness, sorethroat, and postnasal drainage. The most common sign of LPR is frequent throat clearing. The only effective treatment for this condition is use of a medication, called a proton pump inhibitor (PPI). This medication decreases the amount of acid your stomach lining makes and puts into the stomach fluid. This raises the pH of the stomach fluid. Under this condition the pepsin is less active when reflux does occur and it, therefore, does not irritate your throat as much. Research with omeprazole, one type of PPI medication, showed that twice daily therapy may be needed to provide maximum and optimum effect to control or improve these symptoms.    In the treatment of this condition, 40 mg once daily DOES NOT EQUAL 20 mg actually occur at all secondary to these medications. A recent consensus statement of national gastroenterologist has found no increased incidence of these adverse effects with PPI medications. You should discuss these with your primary care physician. Also, read all information given by your pharmacist regarding your medications. Please ask if you have any additional questions.

## 2020-07-13 NOTE — LETTER
[facility] CARDIOLOGY[address]  Dept: [phone]  Dept Fax: [phone]      July 15, 2020    Fabian Kerns  : 1945  DOS: 2020    To Whom it May Concern:    Iglesia Vela has been evaluated in our office for atrial tachycardia. He may hold his Aspirin per surgeons recommendations. He may proceed with his surgery with / nodule removal from an electrophysiology standpoint. There is no further cardiac testing that could be done to lower the risk. Please let my office know if you have any questions or concerns.           Nimesh Sharif MD               [facility] serving [place] and [place]

## 2020-07-17 ENCOUNTER — TELEPHONE (OUTPATIENT)
Dept: ENT CLINIC | Age: 75
End: 2020-07-17

## 2020-07-17 NOTE — TELEPHONE ENCOUNTER
Patient informed that her surgery start time on 07/28/20 has been changed to 8:15 AM, arrival 6:30 AM.  Patient stated that was fine.
- - -

## 2020-07-21 ENCOUNTER — OFFICE VISIT (OUTPATIENT)
Dept: PRIMARY CARE CLINIC | Age: 75
End: 2020-07-21
Payer: MEDICARE

## 2020-07-21 PROCEDURE — 99211 OFF/OP EST MAY X REQ PHY/QHP: CPT | Performed by: NURSE PRACTITIONER

## 2020-07-21 NOTE — PATIENT INSTRUCTIONS
Steps to help prevent the spread of COVID-19 if you are sick  SOURCE - https://ji-salmeron.info/. html     Stay home except to get medical care   ; Stay home: People who are mildly ill with COVID-19 are able to isolate at home during their illness. You should restrict activities outside your home, except for getting medical care.   ; Avoid public areas: Do not go to work, school, or public areas.   ; Avoid public transportation: Avoid using public transportation, ride-sharing, or taxis.  ; Separate yourself from other people and animals in your home   ; Stay away from others: As much as possible, you should stay in a specific room and away from other people in your home. Also, you should use a separate bathroom, if available.   ; Limit contact with pets & animals: You should restrict contact with pets and other animals while you are sick with COVID-19, just like you would around other people. Although there have not been reports of pets or other animals becoming sick with COVID-19, it is still recommended that people sick with COVID-19 limit contact with animals until more information is known about the virus. ; When possible, have another member of your household care for your animals while you are sick. If you are sick with COVID-19, avoid contact with your pet, including petting, snuggling, being kissed or licked, and sharing food. If you must care for your pet or be around animals while you are sick, wash your hands before and after you interact with pets and wear a facemask. See COVID-19 and Animals for more information. Other considerations   The ill person should eat/be fed in their room if possible. Non-disposable  items used should be handled with gloves and washed with hot water or in a . Clean hands after handling used  items.  If possible, dedicate a lined trash can for the ill person.  Use gloves when removing garbage bags, handling, and disposing of trash. Wash hands after handling or disposing of trash.  Consider consulting with your local health department about trash disposal guidance if available. Information for Household Members and Caregivers of Someone who is Sick   Call ahead before visiting your doctor   Call ahead: If you have a medical appointment, call the healthcare provider and tell them that you have or may have COVID-19. This will help the healthcare provider's office take steps to keep other people from getting infected or exposed. Wear a facemask if you are sick   ; If you are sick: You should wear a facemask when you are around other people (e.g., sharing a room or vehicle) or pets and before you enter a healthcare provider's office. ; If you are caring for others: If the person who is sick is not able to wear a facemask (for example, because it causes trouble breathing), then people who live with the person who is sick should not stay in the same room with them, or they should wear a facemask if they enter a room with the person who is sick. Cover your coughs and sneezes   ; Cover: Cover your mouth and nose with a tissue when you cough or sneeze.   ; Dispose: Throw used tissues in a lined trash can.   ; Wash hands: Immediately wash your hands with soap and water for at least 20 seconds or, if soap and water are not available, clean your hands with an alcohol-based hand  that contains at least 60% alcohol. Clean your hands often   ;  Wash hands: Wash your hands often with soap and water for at least 20 seconds, especially after blowing your nose, coughing, or sneezing; going to the bathroom; and before eating or preparing food.   ; Hand : If soap and water are not readily available, use an alcohol-based hand  with at least 60% alcohol, covering all surfaces of your hands and rubbing them together until they feel dry.   ; Soap and water: Soap and water are the best option if hands are visibly dirty.   ; Avoid touching: Avoid touching your eyes, nose, and mouth with unwashed hands. Handwashing Tips   ; Wet your hands with clean, running water (warm or cold), turn off the tap, and apply soap.  ; Lather your hands by rubbing them together with the soap. Lather the backs of your hands, between your fingers, and under your nails. ; Scrub your hands for at least 20 seconds. Need a timer? Hum the Stanton from beginning to end twice.  ; Rinse your hands well under clean, running water.  ; Dry your hands using a clean towel or air dry them. Avoid sharing personal household items   ; Do not share: You should not share dishes, drinking glasses, cups, eating utensils, towels, or bedding with other people or pets in your home.   ; Wash thoroughly after use: After using these items, they should be washed thoroughly with soap and water. Clean all high-touch surfaces everyday   ; Clean and disinfect: Practice routine cleaning of high touch surfaces.  ; High touch surfaces include counters, tabletops, doorknobs, bathroom fixtures, toilets, phones, keyboards, tablets, and bedside tables.  ; Disinfect areas with bodily fluids: Also, clean any surfaces that may have blood, stool, or body fluids on them.   ; Household : Use a household cleaning spray or wipe, according to the label instructions. Labels contain instructions for safe and effective use of the cleaning product including precautions you should take when applying the product, such as wearing gloves and making sure you have good ventilation during use of the product.     Monitor your symptoms   Seek medical attention: Seek prompt medical attention if your illness is worsening     (e.g., difficulty breathing).   ; Call your doctor: Before seeking care, call your healthcare provider and tell them that you have, or are being evaluated for, COVID-19.   ; Wear a facemask when sick: Put on a facemask before you enter the forget your password. 8. Enter your e-mail address. You will receive e-mail notification when new information is available in [address] 9. Click Sign Up. You can now view your medical record. Additional Information  If you have questions, please contact your physician practice where you receive care. Remember, MyChart is NOT to be used for urgent needs. For medical emergencies, dial 911.

## 2020-07-24 NOTE — PROGRESS NOTES
Name_______________________________________Printed:____________________  Date and time of [id number]__[id number]______________________Mhvmrbs Time:__0645 [facility]______________   1. The instructions given regarding when and if a patient needs to stop oral intake prior to surgery varies. Follow the specific instructions you were given                  __X_Nothing to eat or to drink after Midnight the night before.                             ____Endoscopy patient follow your DRS instructions-generally you will be doing a part of the prep after Midnight                   ____Carbo loading or ERAS instructions will be given to select patients-if you have been given those instructions -please do the following                           The evening before your surgery after dinner before midnight drink 40 ounces of gatorade. If you are diabetic use sugar free. The morning of surgery drink 40 ounces of water. This needs to be finished 3 hours prior to your surgery start time. 2. Take the following pills with a small sip of water on the morning of surgery______AMLODIPINE_____________________________________________                  Do not take blood pressure medications ending in pril or sartan the jeremias prior to surgery or the morning of surgery_   3. Aspirin, Ibuprofen, Advil, Naproxen, Vitamin E and other Anti-inflammatory products and supplements should be stopped for 5 -7days before surgery or as directed by your physician. 4. Check with your Doctor regarding stopping Plavix, Coumadin,Eliquis, Lovenox,Effient,Pradaxa,Xarelto, Fragmin or other blood thinners and follow their instructions. 5. Do not smoke, and do not drink any alcoholic beverages 24 hours prior to surgery. This includes NA Beer. Refrain from the usage of any recreational drugs. 6. You may brush your teeth and gargle the morning of surgery. DO NOT SWALLOW WATER   7.  You MUST make arrangements for a responsible adult to stay on site while you are here and take you home after your surgery. You will not be allowed to leave alone or drive yourself home. It is strongly suggested someone stay with you the first 24 hrs. Your surgery will be cancelled if you do not have a ride home. 8. A parent/legal guardian must accompany a child scheduled for surgery and plan to stay at the hospital until the child is discharged. Please do not bring other children with you. 9. Please wear simple, loose fitting clothing to the hospital.  Preston Dose not bring valuables (money, credit cards, checkbooks, etc.) Do not wear any makeup (including no eye makeup) or nail polish on your fingers or toes. 10. DO NOT wear any jewelry or piercings on day of surgery. All body piercing jewelry must be removed. 11. If you have ___dentures, they will be removed before going to the OR; we will provide you a container. If you wear ___contact lenses or ___glasses, they will be removed; please bring a case for them. 12. Please see your family doctor/pediatrician for a history & physical and/or concerning medications. Bring any test results/reports from your physician's office. PCP__________________Phone___________H&P Appt. Date________             13 If you  have a Living Will and Durable Power of  for Healthcare, please bring in a copy. 15. Notify your Surgeon if you develop any illness between now and surgery  time, cough, cold, fever, sore throat, nausea, vomiting, etc.  Please notify your surgeon if you experience dizziness, shortness of breath or blurred vision between now & the time of your surgery             15. DO NOT shave your operative site 96 hours prior to surgery. For face & neck surgery, men may use an electric razor 48 hours prior to surgery. 16. Shower the night before or morning of surgery using an antibacterial soap or as you have been instructed.              17. To provide excellent care visitors will be limited to one in the room at any given time. 18.  Please bring picture ID and insurance card. 19.  Visit our web site for additional information:  [email or web address]              20.During flu season no children under the age of 15 are permitted in the hospital for the safety of all patients. 21. If you take a long acting insulin in the evening only  take half of your usual  dose the night  before your procedure              22. If you use a c-pap please bring DOS if staying overnight,             23.For your convenience [facility] has a pharmacy on site to fill your prescriptions. 24. If you use oxygen and have a portable tank please bring it  with you the DOS             25. Bring a complete list of all your medications with name and dose include any supplements. 26. Other_Patient instructed to get their COVID-19 test done as directed by their doctor (5-7 days prior to procedure)  or patient states will get on __________. I The day the COVID test is done is considered day one. Instructed to self quarantine after test until DOS. There is a one visitor policy at [facility] for the pre-op phase only for surgery and endoscopy cases. The visitor is expected to leave the facility after the patient is taken back for the procedure. Pain management is NO VISITOR policyThe patients ride is expected to remain in the car with a cell phone for communication. If the ride is leaving the hospital grounds please make sure they are back in time for pickup. Have the patient inform the staff on arrival what their rides plans are while the patient is in the facility. At the MAIN there is one visitor allowed. Please note that the visitor policy is subject to change._________________________________________   *Please call pre admission testing if you any further questions   Anna Rascon [address] [place] [address].  Gisell Manzo  [phone]   [facility] [address] CAITLIN HARRIS [phone]       All above information reviewed with patient in person or by phone. Patient verbalizes understanding. All questions and concerns addressed.                                                                                                  Patient/Rep_PT___________________                                                                                                                                    PRE OP INSTRUCTIONS

## 2020-07-26 LAB
SARS-COV-2: NOT DETECTED
SOURCE: NORMAL

## 2020-07-28 ENCOUNTER — ANESTHESIA EVENT (OUTPATIENT)
Dept: OPERATING ROOM | Age: 75
End: 2020-07-28
Payer: MEDICARE

## 2020-07-28 ENCOUNTER — HOSPITAL ENCOUNTER (OUTPATIENT)
Age: 75
Setting detail: OUTPATIENT SURGERY
Discharge: HOME | End: 2020-07-28
Attending: OTOLARYNGOLOGY | Admitting: OTOLARYNGOLOGY
Payer: MEDICARE

## 2020-07-28 ENCOUNTER — ANESTHESIA (OUTPATIENT)
Dept: OPERATING ROOM | Age: 75
End: 2020-07-28
Payer: MEDICARE

## 2020-07-28 VITALS
TEMPERATURE: 97 F | HEART RATE: 74 BPM | OXYGEN SATURATION: 94 % | DIASTOLIC BLOOD PRESSURE: 82 MMHG | RESPIRATION RATE: 11 BRPM | SYSTOLIC BLOOD PRESSURE: 145 MMHG | BODY MASS INDEX: 22.68 KG/M2 | HEIGHT: 63 IN | WEIGHT: 128 LBS

## 2020-07-28 VITALS — OXYGEN SATURATION: 98 % | DIASTOLIC BLOOD PRESSURE: 81 MMHG | SYSTOLIC BLOOD PRESSURE: 151 MMHG

## 2020-07-28 PROBLEM — J38.7 MASS OF EPIGLOTTIS: Chronic | Status: ACTIVE | Noted: 2020-07-28

## 2020-07-28 LAB
EKG ATRIAL RATE: 65 BPM
EKG DIAGNOSIS: NORMAL
EKG P AXIS: 14 DEGREES
EKG P-R INTERVAL: 170 MS
EKG Q-T INTERVAL: 476 MS
EKG QRS DURATION: 130 MS
EKG QTC CALCULATION (BAZETT): 495 MS
EKG R AXIS: 34 DEGREES
EKG T AXIS: 47 DEGREES
EKG VENTRICULAR RATE: 65 BPM

## 2020-07-28 PROCEDURE — 7100000000 HC PACU RECOVERY - FIRST 15 MIN: Performed by: OTOLARYNGOLOGY

## 2020-07-28 PROCEDURE — 88304 TISSUE EXAM BY PATHOLOGIST: CPT

## 2020-07-28 PROCEDURE — 3700000000 HC ANESTHESIA ATTENDED CARE: Performed by: OTOLARYNGOLOGY

## 2020-07-28 PROCEDURE — 6360000002 HC RX W HCPCS: Performed by: OTOLARYNGOLOGY

## 2020-07-28 PROCEDURE — 2500000003 HC RX 250 WO HCPCS: Performed by: NURSE ANESTHETIST, CERTIFIED REGISTERED

## 2020-07-28 PROCEDURE — 93010 ELECTROCARDIOGRAM REPORT: CPT | Performed by: INTERNAL MEDICINE

## 2020-07-28 PROCEDURE — 6360000002 HC RX W HCPCS: Performed by: NURSE ANESTHETIST, CERTIFIED REGISTERED

## 2020-07-28 PROCEDURE — 7100000011 HC PHASE II RECOVERY - ADDTL 15 MIN: Performed by: OTOLARYNGOLOGY

## 2020-07-28 PROCEDURE — 2580000003 HC RX 258: Performed by: OTOLARYNGOLOGY

## 2020-07-28 PROCEDURE — 7100000001 HC PACU RECOVERY - ADDTL 15 MIN: Performed by: OTOLARYNGOLOGY

## 2020-07-28 PROCEDURE — 6370000000 HC RX 637 (ALT 250 FOR IP)

## 2020-07-28 PROCEDURE — 3700000001 HC ADD 15 MINUTES (ANESTHESIA): Performed by: OTOLARYNGOLOGY

## 2020-07-28 PROCEDURE — 3600000013 HC SURGERY LEVEL 3 ADDTL 15MIN: Performed by: OTOLARYNGOLOGY

## 2020-07-28 PROCEDURE — 2709999900 HC NON-CHARGEABLE SUPPLY: Performed by: OTOLARYNGOLOGY

## 2020-07-28 PROCEDURE — 93005 ELECTROCARDIOGRAM TRACING: CPT | Performed by: ANESTHESIOLOGY

## 2020-07-28 PROCEDURE — 31541 LARYNSCOP W/TUMR EXC + SCOPE: CPT | Performed by: OTOLARYNGOLOGY

## 2020-07-28 PROCEDURE — 7100000010 HC PHASE II RECOVERY - FIRST 15 MIN: Performed by: OTOLARYNGOLOGY

## 2020-07-28 PROCEDURE — 3600000003 HC SURGERY LEVEL 3 BASE: Performed by: OTOLARYNGOLOGY

## 2020-07-28 RX ORDER — FENTANYL CITRATE 50 UG/ML
INJECTION, SOLUTION INTRAMUSCULAR; INTRAVENOUS PRN
Status: DISCONTINUED | OUTPATIENT
Start: 2020-07-28 | End: 2020-07-28 | Stop reason: SDUPTHER

## 2020-07-28 RX ORDER — DEXAMETHASONE SODIUM PHOSPHATE 4 MG/ML
INJECTION, SOLUTION INTRA-ARTICULAR; INTRALESIONAL; INTRAMUSCULAR; INTRAVENOUS; SOFT TISSUE PRN
Status: DISCONTINUED | OUTPATIENT
Start: 2020-07-28 | End: 2020-07-28

## 2020-07-28 RX ORDER — PROPOFOL 10 MG/ML
INJECTION, EMULSION INTRAVENOUS PRN
Status: DISCONTINUED | OUTPATIENT
Start: 2020-07-28 | End: 2020-07-28 | Stop reason: SDUPTHER

## 2020-07-28 RX ORDER — MAGNESIUM HYDROXIDE 1200 MG/15ML
LIQUID ORAL CONTINUOUS PRN
Status: COMPLETED | OUTPATIENT
Start: 2020-07-28 | End: 2020-07-28

## 2020-07-28 RX ORDER — HYDROCODONE BITARTRATE AND ACETAMINOPHEN 5; 325 MG/1; MG/1
2 TABLET ORAL
Status: DISCONTINUED | OUTPATIENT
Start: 2020-07-28 | End: 2020-07-28 | Stop reason: HOSPADM

## 2020-07-28 RX ORDER — SODIUM CHLORIDE, SODIUM LACTATE, POTASSIUM CHLORIDE, CALCIUM CHLORIDE 600; 310; 30; 20 MG/100ML; MG/100ML; MG/100ML; MG/100ML
INJECTION, SOLUTION INTRAVENOUS CONTINUOUS
Status: DISCONTINUED | OUTPATIENT
Start: 2020-07-28 | End: 2020-07-28 | Stop reason: HOSPADM

## 2020-07-28 RX ORDER — HYDROCODONE BITARTRATE AND ACETAMINOPHEN 10; 325 MG/1; MG/1
TABLET ORAL
Status: COMPLETED
Start: 2020-07-28 | End: 2020-07-28

## 2020-07-28 RX ORDER — SUCCINYLCHOLINE/SOD CL,ISO/PF 200MG/10ML
SYRINGE (ML) INTRAVENOUS PRN
Status: DISCONTINUED | OUTPATIENT
Start: 2020-07-28 | End: 2020-07-28 | Stop reason: SDUPTHER

## 2020-07-28 RX ORDER — ONDANSETRON 2 MG/ML
INJECTION INTRAMUSCULAR; INTRAVENOUS PRN
Status: DISCONTINUED | OUTPATIENT
Start: 2020-07-28 | End: 2020-07-28 | Stop reason: SDUPTHER

## 2020-07-28 RX ORDER — DEXAMETHASONE SODIUM PHOSPHATE 4 MG/ML
INJECTION, SOLUTION INTRA-ARTICULAR; INTRALESIONAL; INTRAMUSCULAR; INTRAVENOUS; SOFT TISSUE PRN
Status: DISCONTINUED | OUTPATIENT
Start: 2020-07-28 | End: 2020-07-28 | Stop reason: SDUPTHER

## 2020-07-28 RX ORDER — EPINEPHRINE 1 MG/ML
INJECTION, SOLUTION, CONCENTRATE INTRAVENOUS
Status: COMPLETED | OUTPATIENT
Start: 2020-07-28 | End: 2020-07-28

## 2020-07-28 RX ORDER — LIDOCAINE HYDROCHLORIDE 20 MG/ML
INJECTION, SOLUTION EPIDURAL; INFILTRATION; INTRACAUDAL; PERINEURAL PRN
Status: DISCONTINUED | OUTPATIENT
Start: 2020-07-28 | End: 2020-07-28 | Stop reason: SDUPTHER

## 2020-07-28 RX ADMIN — FENTANYL CITRATE 50 MCG: 50 INJECTION, SOLUTION INTRAMUSCULAR; INTRAVENOUS at 08:18

## 2020-07-28 RX ADMIN — FENTANYL CITRATE 50 MCG: 50 INJECTION, SOLUTION INTRAMUSCULAR; INTRAVENOUS at 08:46

## 2020-07-28 RX ADMIN — HYDROCODONE BITARTRATE AND ACETAMINOPHEN 1 TABLET: 10; 325 TABLET ORAL at 09:50

## 2020-07-28 RX ADMIN — SODIUM CHLORIDE, POTASSIUM CHLORIDE, SODIUM LACTATE AND CALCIUM CHLORIDE: 600; 310; 30; 20 INJECTION, SOLUTION INTRAVENOUS at 08:17

## 2020-07-28 RX ADMIN — LIDOCAINE HYDROCHLORIDE 100 MG: 20 INJECTION, SOLUTION EPIDURAL; INFILTRATION; INTRACAUDAL; PERINEURAL at 08:22

## 2020-07-28 RX ADMIN — ONDANSETRON 4 MG: 2 INJECTION INTRAMUSCULAR; INTRAVENOUS at 08:49

## 2020-07-28 RX ADMIN — SODIUM CHLORIDE, POTASSIUM CHLORIDE, SODIUM LACTATE AND CALCIUM CHLORIDE: 600; 310; 30; 20 INJECTION, SOLUTION INTRAVENOUS at 07:17

## 2020-07-28 RX ADMIN — PHENYLEPHRINE HYDROCHLORIDE 100 MCG: 10 INJECTION INTRAVENOUS at 08:35

## 2020-07-28 RX ADMIN — Medication 120 MG: at 08:23

## 2020-07-28 RX ADMIN — DEXAMETHASONE SODIUM PHOSPHATE 12 MG: 4 INJECTION, SOLUTION INTRAMUSCULAR; INTRAVENOUS at 08:49

## 2020-07-28 RX ADMIN — PROPOFOL 200 MG: 10 INJECTION, EMULSION INTRAVENOUS at 08:22

## 2020-07-28 ASSESSMENT — PULMONARY FUNCTION TESTS
PIF_VALUE: 13
PIF_VALUE: 3
PIF_VALUE: 13
PIF_VALUE: 0
PIF_VALUE: 13
PIF_VALUE: 1
PIF_VALUE: 10
PIF_VALUE: 14
PIF_VALUE: 0
PIF_VALUE: 14
PIF_VALUE: 12
PIF_VALUE: 12
PIF_VALUE: 3
PIF_VALUE: 12
PIF_VALUE: 13
PIF_VALUE: 13
PIF_VALUE: 5
PIF_VALUE: 13
PIF_VALUE: 4
PIF_VALUE: 1
PIF_VALUE: 5
PIF_VALUE: 4
PIF_VALUE: 12
PIF_VALUE: 12
PIF_VALUE: 13
PIF_VALUE: 0
PIF_VALUE: 13
PIF_VALUE: 13
PIF_VALUE: 16
PIF_VALUE: 0
PIF_VALUE: 2
PIF_VALUE: 3
PIF_VALUE: 3
PIF_VALUE: 13
PIF_VALUE: 15
PIF_VALUE: 4
PIF_VALUE: 2
PIF_VALUE: 12
PIF_VALUE: 3
PIF_VALUE: 20
PIF_VALUE: 19
PIF_VALUE: 11
PIF_VALUE: 2
PIF_VALUE: 13
PIF_VALUE: 15
PIF_VALUE: 13
PIF_VALUE: 4
PIF_VALUE: 4
PIF_VALUE: 13
PIF_VALUE: 14
PIF_VALUE: 12
PIF_VALUE: 13
PIF_VALUE: 27
PIF_VALUE: 3
PIF_VALUE: 3
PIF_VALUE: 13
PIF_VALUE: 1
PIF_VALUE: 11
PIF_VALUE: 13

## 2020-07-28 ASSESSMENT — COPD QUESTIONNAIRES: CAT_SEVERITY: MODERATE

## 2020-07-28 ASSESSMENT — PAIN SCALES - GENERAL
PAINLEVEL_OUTOF10: 2
PAINLEVEL_OUTOF10: 6
PAINLEVEL_OUTOF10: 4

## 2020-07-28 ASSESSMENT — PAIN DESCRIPTION - LOCATION: LOCATION: THROAT

## 2020-07-28 ASSESSMENT — PAIN DESCRIPTION - PAIN TYPE: TYPE: SURGICAL PAIN

## 2020-07-28 ASSESSMENT — PAIN - FUNCTIONAL ASSESSMENT: PAIN_FUNCTIONAL_ASSESSMENT: 0-10

## 2020-07-28 NOTE — ANESTHESIA POSTPROCEDURE EVALUATION
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        Anesthesia Post Evaluation    Patient location during evaluation: PACU  Patient participation: complete - patient participated  Level of consciousness: awake and alert  Pain score: 2  Airway patency: patent  Nausea & Vomiting: no vomiting  Complications: no  Cardiovascular status: blood pressure returned to baseline  Respiratory status: acceptable  Hydration status: euvolemic

## 2020-07-28 NOTE — PROGRESS NOTES
Awake alert & oriented. Respirations easy & symmetrical. No active bleeding noted. States pain has eased to tolerable level. Patient discharged per wheelchair to the care of responsible party. No additional questions voiced related to discharge information. Patient discharged with all personal items.

## 2020-07-28 NOTE — H&P
Date of Surgery Update:  Steve Cox was seen, history and physical examination reviewed, and patient examined by me today. There have been no significant clinical changes since the completion of the previous history and physical.    The risk, benefits, and alternatives of the proposed procedure have been explained to the patient (or appropriate guardian) and understanding verbalized. All questions answered. Patient was advised, and confirmed her understanding,  that this procedure is for diagnosis purposes and removal of the mass/cyst, if possible, but that the lump in the throat sensation may persist and continue despite biopsy or removal of the cyst/mass. Patient wishes to proceed.     Electronically signed by: Adams Caruso MD,7/28/2020,8:12 AM

## 2020-07-28 NOTE — ANESTHESIA PRE PROCEDURE
Department of Anesthesiology  Preprocedure Note       Name:  Selina Bardales   Age:  76 y.o.  :  1945                                          MRN:  [id number]         Date:  2020      Surgeon: Gracie Carbone):  Neli Musa MD    Procedure: DIRECT LARYNGOSCOPY AND BIOPSY OR EXCISION OF THE NODULE ON THE EPIGLOTTIS AND BIOPSY OF ANY OTHER ABNORMALITIES DETECTED (91728 or 72153 or 82201 or 80928) (N/A Throat)    Medications prior to admission:   Prior to Admission medications    Medication Sig Start Date End Date Taking? Authorizing Provider   omeprazole (PRILOSEC) 20 MG delayed release capsule Take 1 capsule by mouth 2 times daily; take on an empty stomach and eat a meal or snack 45 to 60 minutes hour after each dose.  20   Neli Musa MD   calcium carbonate 600 MG TABS tablet Take 1 tablet by mouth daily 19   Shaun Martinez MD   tiZANidine (ZANAFLEX) 4 MG tablet Take 4 mg by mouth every 6 hours as needed    Historical Provider, MD   aspirin 81 MG tablet Take 81 mg by mouth daily    Historical Provider, MD   rOPINIRole (REQUIP) 0.25 MG tablet Take 0.25 mg by mouth 3 times daily    Historical Provider, MD   atorvastatin (LIPITOR) 20 MG tablet Take 20 mg by mouth daily    Historical Provider, MD   cetirizine (ZYRTEC) 10 MG tablet Take 10 mg by mouth daily    Historical Provider, MD   linaclotide (LINZESS) 145 MCG capsule Take 145 mcg by mouth every morning (before breakfast)    Historical Provider, MD   amLODIPine (NORVASC) 5 MG tablet Take 5 mg by mouth daily  12/31/15   Historical Provider, MD   ferrous sulfate 325 (65 FE) MG tablet Take 325 mg by mouth daily (with breakfast)    Historical Provider, MD   Multiple Vitamins-Minerals (CENTRUM SILVER PO) Take by mouth daily     Historical Provider, MD   zolpidem (AMBIEN) 10 MG tablet TAKE ONE TABLET BY MOUTH EVERY NIGHT AT BEDTIME AS NEEDED FOR SLEEP 5/19/15   MUSA Hernandez - AYESHA   HYDROcodone-acetaminophen (NORCO)  MG per tablet One table every 6 hours as needed for pain. 5/11/15   Lawson Gaytan MD   Omega-3 350 MG CAPS Take 1 capsule by mouth daily. 8/6/13   MUSA Vitale CNP   Cholecalciferol (VITAMIN D3) 1000 UNITS TABS TAKE ONE TABLET BY MOUTH EVERY DAY 8/4/13   MUSA Vitale CNP       Current medications:    No current facility-administered medications for this visit. No current outpatient medications on file. Facility-Administered Medications Ordered in Other Visits   Medication Dose Route Frequency Provider Last Rate Last Dose    lactated ringers infusion   Intravenous Continuous Juan Miguel Rosenbaum MD 50 mL/hr at 07/28/20 3984         Allergies: Allergies   Allergen Reactions    Morphine      40 yrs ago threw up after surgery don't know if it was morphine or the anesthesia       Problem List:    Patient Active Problem List   Diagnosis Code    HTN (hypertension) I10    COPD (chronic obstructive pulmonary disease) ([facility] J44.9    Environmental allergies Z91.09    Vitamin D deficiency E55.9    Osteoarthritis of more than one site M15.9    Back pain, chronic M54.9, G89.29    Hernia, abdominal K46.9    Insomnia G47.00    PAT (paroxysmal atrial tachycardia) ([facility]) I47.1    Dysuria R30.0    Hypercholesteremia E78.00    Anemia D64.9    Muscle spasm M62.838    Depression with anxiety F41.8    Spinal stenosis M48.00    Fracture of 3rd metatarsal S92.333A    Closed displaced fracture of medial malleolus of left tibia S82. 46XA    Nontraumatic complete tear of right rotator cuff M75.121    Globus pharyngeus R09.89    Subjective tinnitus of both ears H93.13       Past Medical History:        Diagnosis Date    Acute sinusitis 1/3/2014    Anemia     Back pain, acute 1/3/2014    Chest pain, non-cardiac 11/12/2013    CKD (chronic kidney disease) stage 3, GFR 30-59 ml/min ([facility])     Closed fracture of fourth thoracic vertebra ([facility]     COPD (chronic obstructive pulmonary disease) ([facility] 3/5/2012    BMI:   Wt Readings from Last 3 Encounters:   07/28/20 128 lb (58.1 kg)   07/13/20 130 lb (59 kg)   06/05/20 130 lb (59 kg)     There is no height or weight on file to calculate BMI.    CBC:   Lab Results   Component Value Date    WBC 5.3 01/20/2019    RBC 3.99 01/20/2019    HGB 12.6 01/20/2019    HCT 37.9 01/20/2019    MCV 94.9 01/20/2019    RDW 14.5 01/20/2019     01/20/2019       CMP:   Lab Results   Component Value Date     01/20/2019    K 4.1 01/20/2019     01/20/2019    CO2 24 01/20/2019    BUN 21 01/20/2019    CREATININE 1.3 01/20/2019    GFRAA 48 01/20/2019    GFRAA 52 03/19/2013    AGRATIO 1.5 02/25/2015    LABGLOM 40 01/20/2019    GLUCOSE 148 01/20/2019    PROT 8.2 11/06/2017    PROT 7.1 03/19/2013    CALCIUM 9.4 01/20/2019    BILITOT 0.3 11/06/2017    ALKPHOS 62 11/06/2017    AST 17 11/06/2017    ALT 12 11/06/2017       POC Tests: No results for input(s): POCGLU, POCNA, POCK, POCCL, POCBUN, POCHEMO, POCHCT in the last 72 hours.     Coags:   Lab Results   Component Value Date    PROTIME 11.4 01/20/2019    INR 1.00 01/20/2019    APTT 37.7 09/07/2016       HCG (If Applicable): No results found for: PREGTESTUR, PREGSERUM, HCG, HCGQUANT     ABGs: No results found for: PHART, PO2ART, PAV6YVM, MQP6YWG, BEART, Q2BDAJSP     Type & Screen (If Applicable):  No results found for: [facility]    Anesthesia Evaluation  Patient summary reviewed and Nursing notes reviewed  Airway: Mallampati: II  TM distance: >3 FB   Neck ROM: full  Mouth opening: > = 3 FB Dental:    (+) caps      Pulmonary:   (+) COPD: moderate,  decreased breath sounds,                             Cardiovascular:  Exercise tolerance: good (>4 METS),   (+) hypertension: moderate, dysrhythmias (hx pat): atrial fibrillation, hyperlipidemia      ECG reviewed  Rhythm: regular    Echocardiogram reviewed                  Neuro/Psych:   (+) neuromuscular disease:, psychiatric history: stable with treatmentdepression/anxiety             GI/Hepatic/Renal:   (+) renal disease: CRI,           Endo/Other:    (+) blood dyscrasia::., .                 Abdominal:           Vascular:   + DVT, PE. Anesthesia Plan      TIVA and general     ASA 3       Induction: intravenous and rapid sequence. MIPS: Postoperative opioids intended, Prophylactic antiemetics administered and Postoperative trial extubation. Anesthetic plan and risks discussed with patient. Plan discussed with CRNA.                 Russella Primrose, MD   7/28/2020

## 2020-07-28 NOTE — PROGRESS NOTES
Norco 10mg given per order of Dr Cheryl Carrasquillo for c/o mild throat pain & moderate chronic back pain.

## 2020-07-28 NOTE — PROGRESS NOTES
Adm to pacu from or per cart awakening. Respirations easy & symmetrical. No active bleeding noted. C/O mild pain. Denies need for pain med.

## 2020-07-29 NOTE — OP NOTE
general  anesthesia was induced and maintained by the attending anesthetist using  oroendotracheal tube technique. A time-out was held and the patient was  identified and the procedure confirmed. The upper tooth guard was  placed. The patient was draped in a sterile manner. The Holinger  anterior commissure laryngoscope was then inserted and direct  laryngoscopy performed with the above findings. The scope was removed. The Dedo laryngoscope was then inserted and placed to visualize the left  epiglottic masses. Suspension apparatus was attached and the patient  placed into suspension laryngoscopy position. The microscope was  brought into position and microscopic laryngoscopy was performed. Then  the cyst was visualized. It was grasped with a cup biopsy forceps and  retracted. Mucosal incisions were made and the cyst dissected in a blunt and sharp microlaryngeal dissection technique from the  surface of the epiglottis with microlaryngeal scissors. A second cyst  was seen medial and slightly anterior to the first cyst and this was  also grasped, retracted and removed in the same manner. Then the  laryngoscope was repositioned to visualize the cyst on the right side of  the epiglottis. This was removed in identical fashion. The denuded  areas were treated with a surgical cottonoid moistened with 1:100,000  epinephrine for an appropriate time. When these were removed,  hemostasis was completely maintained. The upper tooth guard was  removed. The laryngoscope was removed. The patient was then awakened  and extubated and taken to recovery room in stable condition having  tolerated this procedure well with no intraoperative complications and  negligible blood loss.         Tiki Villa MD    D: 07/28/2020 10:11:05       T: 07/28/2020 12:54:27     [email or web address]  Job#: [id number]     Doc#: [id number]    CC:

## 2020-07-30 ENCOUNTER — TELEPHONE (OUTPATIENT)
Dept: ENT CLINIC | Age: 75
End: 2020-07-30

## 2020-07-30 NOTE — TELEPHONE ENCOUNTER
Patient had surgery Tuesday morning and is having complaints of muscle and joint pain. Patient states she hasn't been able to get out of bed since yesterday. Patient was up today due to having an appointment.  Patient is wondering if this is normal?

## 2020-07-31 RX ORDER — METHYLPREDNISOLONE 4 MG/1
TABLET ORAL
Qty: 1 KIT | Refills: 0 | Status: SHIPPED | OUTPATIENT
Start: 2020-07-31 | End: 2020-08-27

## 2020-07-31 NOTE — TELEPHONE ENCOUNTER
Pt does not think the pain is from the anethesia. She has had several times prior with no complications. Pain has increased from yesterday, muscles and joints are very painful. Please advise.

## 2020-07-31 NOTE — TELEPHONE ENCOUNTER
I have seen this many times in the past due to the muscle relaxants used by the anesthetist. It usually clears up in several days. Anti-inflammatory medications such as ibuprofen or Naprosyn may help. Her experience with prior anesthesia does not rule out a reaction now. This is most likely a reaction to the muscle relaxant used for anesthetic. Otherwise, it could be an exacerbation of underlying arthritis. In either case, not something I treat. This is a problem that would be handled by her PCP. She should call and/or see her primary care physician regarding this problem. I will give her a call.

## 2020-07-31 NOTE — TELEPHONE ENCOUNTER
I spoke to patient by telephone and advised her of the content of my preceding message. Also advised her of the pathology results showing no evidence of cancer. She stated that her previous symptoms of feeling a lump in the throat when lying on the right side has completely cleared up since the surgery. She stated that she is very happy about that. I sent e-script for Medrol dose pack. Naprosyn and other NSAIDs are not advised due to hypertension and age. She agreed to give the condition 2 to 5 days and if not resolved she will see her primary care physician.

## 2020-08-24 ENCOUNTER — HOSPITAL ENCOUNTER (OUTPATIENT)
Dept: ULTRASOUND IMAGING | Age: 75
Discharge: HOME | End: 2020-08-24
Payer: MEDICARE

## 2020-08-24 PROCEDURE — 76536 US EXAM OF HEAD AND NECK: CPT

## 2020-08-27 ENCOUNTER — VIRTUAL VISIT (OUTPATIENT)
Dept: ENDOCRINOLOGY | Age: 75
End: 2020-08-27
Payer: MEDICARE

## 2020-08-27 PROCEDURE — 99442 PR PHYS/QHP TELEPHONE EVALUATION 11-20 MIN: CPT | Performed by: INTERNAL MEDICINE

## 2020-08-27 NOTE — Clinical Note
Please schedule for a follow up in  9 months and mail patient  lab orders and orders for thyroid ultrasound

## 2020-08-27 NOTE — PROGRESS NOTES
SUBJECTIVE:  Cristy Rivers is a 76 y.o. female who is seen here for multinodular thyroid goiter. Patient was diagnosed with thyroid nodule when she was imaged for nonspecific complaints of mucus stuck in throat approximately in January 2020     current complaints: denies fatigue, weight changes, heat/cold intolerance, bowel/skin changes or CVS symptoms  History of obstructive symptoms: difficulty swallowing Yes, changes in voice/hoarseness no . History of radiation to patient's neck: NO  Recent iodine exposure: No  Family history includes no thyroid abnormalities. Family history of thyroid cancer: No  Thyroid ultrasound shows 2.7 cm nodule in the inferior left lobe and a subcentimeter nodule inferomedial to this nodule. The ultrasound was done in January 2020. Patient has hypertension and is on Norvasc 5 mg daily  She also has hyperlipidemia and is on Lipitor 20 mg daily  For her neuropathy she takes gabapentin 600 mg daily  She is on chronic pain medication    She carries the diagnosis of COPD which is stable  She also carries the diagnosis of depression and history of DVT and pulmonary embolism    Interim history  She underwent laryngoscopy and excision of the nodule on the left epiglottis and excision of the cyst of right epiglottis on July 28, 2020.   She still continues to have the problem of mucus in her throat  She did not get her blood work done which were ordered back in February 2020 and she does not recall that she was given the orders  She denies any other symptoms other than the mucus issues    Past Medical History:   Diagnosis Date    Acute sinusitis 1/3/2014    Anemia     Back pain, acute 1/3/2014    Chest pain, non-cardiac 11/12/2013    CKD (chronic kidney disease) stage 3, GFR 30-59 ml/min [facility]     Closed fracture of fourth thoracic vertebra ([address]     COPD (chronic obstructive pulmonary disease) ([address] 3/5/2012    Counseling NOS 5/2/2013    Depression with anxiety     DVT (deep venous thrombosis) ([address]     Fracture of 5th metatarsal 9/6/2013    History of pulmonary embolism 6/13/2012    Hx of blood clots     Hypercholesteremia     Hypertension     Hypomagnesemia 6/13/2012    Increased MCV 6/13/2012    Insomnia secondary to situational depression 12/12/2012    Left sided abdominal pain 11/13/2013    Lumbar strain 3/27/2013    Muscle spasm     chronic    PE (pulmonary thromboembolism) 10/25/2011    Pulmonary embolism ([address]     Trigger finger 11/12/2013     Patient Active Problem List    Diagnosis Date Noted    Dysuria 10/09/2014     Priority: Medium     Class: Temporary    Mass of epiglottis 07/28/2020    Globus pharyngeus 06/05/2020    Subjective tinnitus of both ears 06/05/2020    Nontraumatic complete tear of right rotator cuff 01/07/2020    Closed displaced fracture of medial malleolus of left tibia     Fracture of 3rd metatarsal 09/04/2015    Spinal stenosis 05/05/2015    Hypercholesteremia 11/25/2014    Anemia 11/25/2014    Muscle spasm     Depression with anxiety     PAT (paroxysmal atrial tachycardia) ([address] 09/05/2014    Insomnia 06/11/2014    Hernia, abdominal 11/12/2013    Osteoarthritis of more than one site 07/11/2013    Back pain, chronic 07/11/2013    Environmental allergies 04/15/2013    Vitamin D deficiency 04/15/2013    COPD (chronic obstructive pulmonary disease) ([address] 03/05/2012    HTN (hypertension) 10/25/2011     Past Surgical History:   Procedure Laterality Date    ABDOMEN SURGERY      tummy tuck    ABDOMINOPLASTY  9/1/2011    ANKLE FRACTURE SURGERY Left 10-9/2015    BREAST ENHANCEMENT SURGERY      9/1/2011    CARPAL TUNNEL RELEASE      R    COLONOSCOPY N/A 10/9/2019    COLONOSCOPY POLYPECTOMY SNARE/COLD BIOPSY performed by Alisha Salazar MD at [address] [facility]  10/9/2019    COLONOSCOPY WITH BIOPSY performed by Alisha Salazar MD at [address] [facility]      Aug 19/2015    HYSTERECTOMY total-pre cancerous cells    KNEE SURGERY      R    LARYNGOSCOPY N/A 2020    DIRECT LARYNGOSCOPY AND EXCISION OF THE NODULE ON THE LEFT EPIGLOTTIS AND EXCISION  OF  CYST OF RIGHT EPIGLOTTIS ([id number] or [phone] or [id number] or [phone]) performed by Jose Harmon MD at [address] [place] [address]       Family History   Problem Relation Age of Onset    Cancer Mother         pancrease     Stroke Father     Stroke Brother     Heart Disease Neg Hx      Social History     Socioeconomic History    Marital status:       Spouse name: None    Number of children: None    Years of education: None    Highest education level: None   Occupational History    None   Social Needs    Financial resource strain: None    Food insecurity     Worry: None     Inability: None    Transportation needs     Medical: None     Non-medical: None   Tobacco Use    Smoking status: Former Smoker     Packs/day: 0.00     Years: 15.00     Pack years: 0.00     Types: Cigarettes     Last attempt to quit: 10/9/2012     Years since quittin.8    Smokeless tobacco: Never Used   Substance and Sexual Activity    Alcohol use: No     Alcohol/week: 20.0 standard drinks     Types: 24 Standard drinks or equivalent per week     Comment: [address] [facility] [address] 10/10/2012    Drug use: No    Sexual activity: None   Lifestyle    Physical activity     Days per week: None     Minutes per session: None    Stress: None   Relationships    Social connections     Talks on phone: None     Gets together: None     Attends [employer] service: None     Active member of club or organization: None     Attends meetings of clubs or organizations: None     Relationship status: None    Intimate partner violence     Fear of current or ex partner: None     Emotionally abused: None     Physically abused: None     Forced sexual activity: None   Other Topics Concern    None   Social History Narrative    None     Current Outpatient Medications   Medication Sig her left throat s/p excision of epiglottis cyst   Her symptom of mucus/snot stuck in the left side of her throat in the submandibular area does not seem to be related to the thyroid nodule   She has already followed up with ear nose and throat doctor and had excision done of the epiglottis cyst she still continues to have the symptoms she has a follow-up coming with her ENT      ---Essential hypertension  Well-controlled on current therapy she will stay on amlodipine    -- Osteoporosis due to hx of  Vertebral fracture as per MRI in 2016 --Osteopenia on her last DEXA scan in November 2018  She had a fall from chair at her kitchen counter in 2016   She is taking calcium and vitamin D supplementation  She will benefit from bisphosphonate therapy due to history of vertebral fractures but patient is not interested in that she will follow-up with the primary care physician    ---Vitamin D deficiency  Most recent vitamin D level was normal    -- Chronic back pain  Stable as per primary care physician      Reviewed and/or ordered clinical lab results Yes  Reviewed and/or ordered radiology tests Yes   Reviewed and/or ordered other diagnostic tests Yes  Made a decision to obtain old records Yes  Reviewed and summarized old records Yes      Lashawn Severs was counseled regarding symptoms of current diagnosis, course and complications of disease if inadequately treated, side effects of medications, diagnosis, treatment options, and prognosis, risks, benefits, complications, and alternatives of treatment, labs, imaging and other studies and treatment targets and goals. She understands instructions and counseling. This was a phone conversation in [place] of in-person visit due to coronavirus emergency. Patient provided verbal consent for an \"over the phone visit'.   Location of patient; home  Total time spent 18 minutes on this call conducting an interview, collecting data and reviewing her chart, performing a limited exam by phone and educating the patient on my assessment and plan. Return in about 9 months (around 5/27/2021). Please note that some or all of this report was generated using voice recognition software. Please notify me in case of any questions about the content of this document, as some errors in transcription may have occurred .

## 2020-08-31 ENCOUNTER — HOSPITAL ENCOUNTER (OUTPATIENT)
Age: 75
Discharge: HOME | End: 2020-08-31
Payer: MEDICARE

## 2020-08-31 ENCOUNTER — OFFICE VISIT (OUTPATIENT)
Dept: ENT CLINIC | Age: 75
End: 2020-08-31
Payer: MEDICARE

## 2020-08-31 VITALS
HEART RATE: 97 BPM | TEMPERATURE: 97.8 F | SYSTOLIC BLOOD PRESSURE: 96 MMHG | BODY MASS INDEX: 22.68 KG/M2 | WEIGHT: 128 LBS | DIASTOLIC BLOOD PRESSURE: 71 MMHG | HEIGHT: 63 IN

## 2020-08-31 LAB
MAGNESIUM: 2.3 MG/DL (ref 1.8–2.4)
TSH SERPL DL<=0.05 MIU/L-ACNC: 2.84 UIU/ML (ref 0.27–4.2)

## 2020-08-31 PROCEDURE — 36415 COLL VENOUS BLD VENIPUNCTURE: CPT

## 2020-08-31 PROCEDURE — 31575 DIAGNOSTIC LARYNGOSCOPY: CPT | Performed by: OTOLARYNGOLOGY

## 2020-08-31 PROCEDURE — 83735 ASSAY OF MAGNESIUM: CPT

## 2020-08-31 PROCEDURE — 84443 ASSAY THYROID STIM HORMONE: CPT

## 2020-08-31 NOTE — PATIENT INSTRUCTIONS
Laryngopharyngeal Reflux    Laryngopharyngeal reflux (LPR) is a condition in which stomach fluid refluxes, or flows backwards, up into your throat. This affects the back area of your voice box. It happens hundreds of times a day and involves a very small amount of fluid each time. There is no sensation or awareness of this reflux, such as the heartburn, pain, or indigestion associated with a related condition, gastroesophageal reflux, or GERD. These two conditions are distinctly different, although they may coexist in a given person. Generally, you will not be aware when LPR is happening. However, the stomach fluid contains an enzyme, called pepsin, which causes inflammation in your throat, which in turn, causes your symptoms. The most common symptoms associated with LPR reflux are a sensation of a lump in the throat or mucus that you cannot clear or something stuck in the throat, chronic cough, chronic hoarseness, sorethroat, and postnasal drainage. The most common sign of LPR is frequent throat clearing. The only effective treatment for this condition is use of a medication, called a proton pump inhibitor (PPI). This medication decreases the amount of acid your stomach lining makes and puts into the stomach fluid. This raises the pH of the stomach fluid. Under this condition the pepsin is less active when reflux does occur and it, therefore, does not irritate your throat as much. Research with omeprazole, one type of PPI medication, showed that twice daily therapy may be needed to provide maximum and optimum effect to control or improve these symptoms. In the treatment of this condition, 40 mg once daily DOES NOT EQUAL 20 mg twice daily! In addition, the PPI medication must be taken on an empty stomach to maximize digestion and absorption of the medication. Your stomach is empty when you have had nothing to eat or drink for the preceding two hours.   Further, for maximum effectiveness, you must eat a meal or snack about 45 minutes after each dose to maximize uptake from the bloodstream of the medication by the acid producing cells of the stomach in order to decrease the amount of acid your stomach makes. If this happens, the pH of the fluids in the stomach is increased from 2 to 4 and the pepsin enzyme is inactive, or less active, and causes less inflammation of your throat. This will decrease your symptoms. Twice daily therapy may be accomplished by taking the medication first thing each morning and delaying your breakfast for about 45 minutes. Then, you can take the second dose of the medication 45 minutes before your dinner meal.  If you forget to take your pill before you eat dinner, wait until two hours after dinner and take your pill. Then, have a light snack or finish your dinner about 45 minutes later. Your primary physician or gastroenterologist may also be treating you for GERD. This treatment for LPR will not interfere with your GERD management. Please be aware of the possible side effects and interactions of Omeprazole (Prilosec), including, but not limited to:  allergic reaction, nausea, headache, diarrhea, increased risk of fractures wrists or hips or spine, increased risk of Clostridium difficile-associated diarrhea (CDAD), low serum magnesium, excess gas, bloating, stomach pain, constipation, dry mouth, blisters, or peeling skin. A recent study has shown a possible association of omeprazole with increased risk of heart attack. A recent study has shown a possible association of omeprazole with kidney damage or disease. These potential adverse effects are considered to be very rare, if they actually occur at all secondary to these medications. A recent consensus statement of national gastroenterologist has found no increased incidence of these adverse effects with PPI medications. You should discuss these with your primary care physician.   Also, read all information given by your pharmacist regarding your medications. Please ask if you have any additional questions.

## 2020-08-31 NOTE — PROGRESS NOTES
Chief Complaint   Patient presents with    Laryngitis    Pharyngitis       PROCEDURE:  FLEXIBLE FIBEROPTIC NASOPHARYNGOLARYNGOSCOPY  INDICATION:  Need for detailed endoscopic examination of the larynx and pharynx to evaluate the upper aerodigestive tract for recheck of LPRD and chronic reflux laryngitis. Patient stated that she still has the lump feeling in the throat, which is the same as before surgery, its annoying. Patient stated she did not start the twice daily omeprazole for the LPR previously. INFORMED CONSENT:  The procedure was described to the patient, including method of anesthesia. The patient was advised of the medical necessity for this procedure. The risks and potential complications were discussed, including, but not limited to, bleeding, infection, adverse reaction to medications, hoarseness, sore throat, inability to obtain adequate visualization, and future need for rigid operative endoscopy. The expected outcome, potential benefits and the alternatives of therapy were discussed. Radha Woodbury asked appropriate questions and then expressed the lack of any further questions, understanding, acceptance, and the desire to undergo with this procedure, granting verbal informed consent. FINDINGS:  There was mild edema and erythema of the arytenoid and interarytenoid mucosa consistent with posterior laryngitis secondary to laryngopharyngeal reflux. The vocal cords appeared to be normal, with no nodule, ulceration, polyp, leukoplakia or other lesions. The vocal cords appeared to be normally mobile bilaterally with midline approximation on phonation. Sensation of the hypopharynx and larynx appeared to be normal when touched by the end of the flexible scope. The nasopharynx, eustachian tube orifices and fossa of Rosenmüller, oropharynx, base of tongue, hypopharynx, supraglottis, subglottis, and piriform sinuses all appeared to be normal, with no lesions.   Visualization was excellent throughout the examination. DESCRIPTION OF PROCEDURE:  The right and left nasal cavity was topically anesthetized and decongested with a 50-50 mixture of 0.05% oxymetazoline solution and topical 4% lidocaine solution by nasal sprayer, twice. After about ten minutes, the flexible fiberoptic nasopharyngolaryngoscope, with camera attached, was inserted through the right nare/nasal cavity and advanced to the nasopharynx and then to the hypopharynx and larynx. The [employer] video system was used. After adequate endoscopic visualization, the endoscope was removed. The patient appeared to tolerate the procedure well with no evidence of perioperative complications. Chayo Oh / Lisa Le / Juanita Foley was seen today for laryngitis and pharyngitis. Diagnoses and all orders for this visit:    LPRD (laryngopharyngeal reflux disease)  -     Magnesium; Future    Chronic laryngitis  -     Magnesium; Future    Globus pharyngeus  -     Magnesium; Future         RECOMMENDATIONS / PLAN    1. Start taking twice daily omeprazole, LPRD regimen. 2. See patient instructions, on file for this visit, which were fully discussed with the patient. 3. Return in about 3 months (around 11/30/2020) for repeat flexible fiberoptic throat endoscopy, recheck/follow-up, and sooner if condition worsens.

## 2020-09-01 ENCOUNTER — TELEPHONE (OUTPATIENT)
Dept: ENT CLINIC | Age: 75
End: 2020-09-01

## 2020-09-01 NOTE — TELEPHONE ENCOUNTER
The medication was prescribed on 7/13/2020, and not on 8/31/2020. A 90 supply was prescribed on 7/13/2020. She never got it filled. I spoke to the pharmacy and it is still good so she can go to the pharmacy at get it. Please notify patient.

## 2020-09-25 NOTE — PROGRESS NOTES
Pt stated she does not need to schedule a f/u appt. Pt also stated she uses a [facility] and imaging so she does not need orders mailed.

## 2020-11-19 ENCOUNTER — OFFICE VISIT (OUTPATIENT)
Dept: ENT CLINIC | Age: 75
End: 2020-11-19
Payer: MEDICARE

## 2020-11-19 VITALS
BODY MASS INDEX: 24.12 KG/M2 | TEMPERATURE: 97.1 F | HEART RATE: 104 BPM | SYSTOLIC BLOOD PRESSURE: 112 MMHG | WEIGHT: 134 LBS | DIASTOLIC BLOOD PRESSURE: 78 MMHG

## 2020-11-19 PROCEDURE — 31575 DIAGNOSTIC LARYNGOSCOPY: CPT | Performed by: OTOLARYNGOLOGY

## 2020-11-19 NOTE — PROGRESS NOTES
Chief Complaint   Patient presents with    Laryngitis     recheck LPRD and chronic reflux laryngitis       PROCEDURE:  FLEXIBLE FIBEROPTIC NASOPHARYNGOLARYNGOSCOPY  INDICATION:  Inadequate visualization by indirect laryngoscopy mirror examination and need for detailed endoscopic examination of the larynx and pharynx to recheck LPRD and chronic reflux laryngitis. Patient stated that she did not get prilosec filled due to \"I was inforrmed that Prilosec has been taken off the market due to it causes cancer. I'm just gonna do without it. \"  She stated that this problem has been going on for about one year. She has a \"feeling is like there is a lump of a lot of snot or mucus in my throat that I cannot get out. \"      INFORMED CONSENT:  The procedure was described to the patient, including method of anesthesia. The patient was advised of the medical necessity for this procedure. The risks and potential complications were discussed, including, but not limited to, bleeding, infection, adverse reaction to medications, hoarseness, sore throat, inability to obtain adequate visualization, and future need for rigid operative endoscopy. The expected outcome, potential benefits and the alternatives of therapy were discussed. Brynn Lopez asked appropriate questions and then expressed the lack of any further questions, understanding, acceptance, and the desire to undergo with this procedure, granting verbal informed consent. FINDINGS:  There was mild edema and erythema of the arytenoid and interarytenoid mucosa consistent with posterior laryngitis secondary to laryngopharyngeal reflux. The vocal cords appeared to be normal, with no nodule, ulceration, polyp, leukoplakia or other lesions, and appeared to be normally mobile bilaterally with midline approximation on phonation. Sensation of the hypopharynx and larynx appeared to be normal when touched by the end of the flexible scope.  The nasopharynx, eustachian tube orifices and fossa of Rosenmüller, oropharynx, base of tongue, hypopharynx, supraglottis, subglottis, and piriform sinuses all appeared to be normal, with no lesions. Visualization was excellent throughout the examination. DESCRIPTION OF PROCEDURE:  The right and left nasal cavity was topically anesthetized and decongested with a 50-50 mixture of 0.05% oxymetazoline solution and topical 4% lidocaine solution by nasal sprayer, twice. After about ten minutes, the flexible fiberoptic nasopharyngolaryngoscope, with camera attached, was inserted through the right nare/nasal cavity and advanced to the nasopharynx and then to the hypopharynx and larynx. The [employer] video system was used. After adequate endoscopic visualization, the endoscope was removed. The patient appeared to tolerate the procedure well with no evidence of perioperative complications. Ron Collins / Vignesh Rhodes / Juan Mittal was seen today for laryngitis. Diagnoses and all orders for this visit:    LPRD (laryngopharyngeal reflux disease)    Chronic laryngitis    Globus pharyngeus         RECOMMENDATIONS / PLAN    1. Patient declined any other PPI medications stating \"I'm just gonna do without it. It's just annoying. \"    2. Return in about 6 months (around 5/19/2021) for repeat flexible fiberoptic throat endoscopy, recheck, follow-up and sooner if condition worsens.

## 2020-12-02 NOTE — PROGRESS NOTES
[facility]   Electrophysiology Follow Up  Date: 12/3/2020      CC:Tachycardia  HPI: Ezra Schuster is a 76 y.o. History of Atrial tachycardia, hypertension, DVT/PE after surgery, and previous tobacco use. Reportedly had a normal stress test September 2011. Last Echo showed normal EF, LV normal. She smoked in the past however she no longer smokes. She wore an event recorder 2012 that shows persistent ST, but no AT. She had no symptoms. She reports that she continues to take Corgard 20 mg daily and prn Diltiazem however she has not needed to take the Diltiazem. Interval History:  Presents today for follow up. Patient denies lightheadedness, dizziness, chest pain, palpitations, orthopnea, edema, presyncope or syncope.      She has has not had any recurrence of her episodes since last year      Past Medical History:   Diagnosis Date    Acute sinusitis 1/3/2014    Anemia     Back pain, acute 1/3/2014    Chest pain, non-cardiac 11/12/2013    CKD (chronic kidney disease) stage 3, GFR 30-59 ml/min     Closed fracture of fourth thoracic vertebra (HCC)     COPD (chronic obstructive pulmonary disease) ([address] 3/5/2012    Counseling NOS 5/2/2013    Depression with anxiety     DVT (deep venous thrombosis) ([address]     Fracture of 5th metatarsal 9/6/2013    History of pulmonary embolism 6/13/2012    Hx of blood clots     Hypercholesteremia     Hypertension     Hypomagnesemia 6/13/2012    Increased MCV 6/13/2012    Insomnia secondary to situational depression 12/12/2012    Left sided abdominal pain 11/13/2013    Lumbar strain 3/27/2013    Muscle spasm     chronic    PE (pulmonary thromboembolism) 10/25/2011    Pulmonary embolism ([address]     Trigger finger 11/12/2013        Past Surgical History:   Procedure Laterality Date    ABDOMEN SURGERY      tummy tuck    ABDOMINOPLASTY  9/1/2011    ANKLE FRACTURE SURGERY Left 10-9/2015    BREAST ENHANCEMENT SURGERY      9/1/2011    CARPAL TUNNEL RELEASE      R    COLONOSCOPY N/A 10/9/2019    COLONOSCOPY POLYPECTOMY SNARE/COLD BIOPSY performed by Danie Henley MD at [address]  10/9/2019    COLONOSCOPY WITH BIOPSY performed by Danie Henley MD at [address] [facility]      Aug 19/2015    HYSTERECTOMY      total-pre cancerous cells    KNEE SURGERY      R    LARYNGOSCOPY N/A 7/28/2020    DIRECT LARYNGOSCOPY AND EXCISION OF THE NODULE ON THE LEFT EPIGLOTTIS AND EXCISION  OF  CYST OF RIGHT EPIGLOTTIS ([address] or [address] or [id number] or [phone]) performed by Dionna Patrick MD at [facility]         Allergies: Allergies   Allergen Reactions    Morphine      40 yrs ago threw up after surgery don't know if it was morphine or the anesthesia       Social History:   reports that she quit smoking about 8 years ago. Her smoking use included cigarettes. She smoked 0.00 packs per day for 15.00 years. She has never used smokeless tobacco. She reports that she does not drink alcohol or use drugs. Family History:  family history includes Cancer in her mother; Stroke in her brother and father. Reviewed. Denies family history of sudden cardiac death, arrhythmia, premature CAD    Review of System:  All other systems reviewed and are negative except for that noted above. Pertinent negatives are:   General: negative for fever, chills   Ophthalmic ROS: negative for - eye pain or loss of vision  ENT ROS: negative for - headaches, sore throat   Respiratory: negative for - cough, sputum  Cardiovascular: Reviewed in HPI  Gastrointestinal: negative for - abdominal pain, diarrhea, N/V  Hematology: negative for - bleeding, blood clots, bruising or jaundice  Genito-Urinary:  negative for - Dysuria or incontinence  Musculoskeletal: negative for - Joint swelling, muscle pain  Neurological: negative for - confusion, dizziness, headaches   Psychiatric: No anxiety, no depression.   Dermatological: negative for - rash    Physical Examination:  Vitals:    20 1256   BP: 108/76   Pulse: 93   SpO2: 95%      Wt Readings from Last 3 Encounters:   20 131 lb 9.6 oz (59.7 kg)   20 134 lb (60.8 kg)   20 128 lb (58.1 kg)       · Constitutional: Oriented. No distress. · Head: Normocephalic and atraumatic. · Mouth/Throat: Oropharynx is clear and moist.   · Eyes: Conjunctivae normal. EOM are normal.   · Neck: Neck supple. No rigidity. No JVD present. · Cardiovascular: Normal rate, regular rhythm, S1&S2. · Pulmonary/Chest: Bilateral respiratory sounds. No wheezes, No rhonchi. · Abdominal: Soft. Bowel sounds present. No distension, No tenderness. · Musculoskeletal: No tenderness. No edema    · Lymphadenopathy: Has no cervical adenopathy. · Neurological: Alert and oriented. Cranial nerve appears intact, No Gross deficit   · Skin: Skin is warm and dry. No rash noted. · Psychiatric: Has a normal behavior       Labs, diagnostic and imaging results reviewed. Reviewed.    Lab Results   Component Value Date    TSH 2.84 2020    TSH 1.86 2014    CREATININE 1.3 2019    CREATININE 1.0 2018    AST 17 2017    ALT 12 2017         EC/3/20   Sinus Rhythm with Right bundle branch block    NM Cardiac stress test 20  Summary    There is normal isotope uptake at stress and rest. There is no evidence of    myocardial ischemia or scar.    Normal LV function.    Left ventricular ejection fraction of 60 %.    Overall findings represent a low risk scan.       Stress Protocols        Resting ECG    Normal sinus rhythm.    RBBB.        Resting HR:70 bpm  Resting BP:106/79 mmHg       Stress Protocol:Exercise - Ronal        Peak HR:133 bpm                            HR/BP product:05572    Peak BP:125/79 mmHg                        Max exercise: 10 METS    Predicted HR: 145 bpm    % of predicted HR: 92    Test duration:7 min and 40 sec    Reason for termination:Physiologic Maximum        ECG Findings    Normal sinus rhythm.    Normal response to exercise stress .        Arrhythmias    Occasional PAC's.        Symptoms    No symptoms with exercise.    Denies chest pain or discomfort      Echo 12/2018   Summary     Left ventricle - normal size, thickness and function with EF of 60%     Mitral valve - mild regurgitation     Tricuspid valve - mild regurgitation with RVSP of 30mmHg     Pulmonic valve - mild regurgitation       Echo: 12/22/15    Conclusions      Summary   Normal left ventricular global systolic function with an estimated ejection   fraction of 55-60%.   There is reversal of E/A inflow velocities across the mitral valve   suggesting impaired left ventricular relaxation.   Trace mitral valve regurgitation noted.   Trivial tricuspid valve regurgitation noted.   Trivial pulmonic valve regurgitation is present.       Medication:  Current Outpatient Medications   Medication Sig Dispense Refill    calcium carbonate 600 MG TABS tablet Take 1 tablet by mouth daily 90 tablet 3    tiZANidine (ZANAFLEX) 4 MG tablet Take 4 mg by mouth every 6 hours as needed      aspirin 81 MG tablet Take 81 mg by mouth daily      rOPINIRole (REQUIP) 0.25 MG tablet Take 0.25 mg by mouth 3 times daily      atorvastatin (LIPITOR) 20 MG tablet Take 20 mg by mouth daily      cetirizine (ZYRTEC) 10 MG tablet Take 10 mg by mouth daily      linaclotide (LINZESS) 145 MCG capsule Take 145 mcg by mouth every morning (before breakfast)      amLODIPine (NORVASC) 5 MG tablet Take 5 mg by mouth daily       Multiple Vitamins-Minerals (CENTRUM SILVER PO) Take by mouth daily       zolpidem (AMBIEN) 10 MG tablet TAKE ONE TABLET BY MOUTH EVERY NIGHT AT BEDTIME AS NEEDED FOR SLEEP 30 tablet 0    HYDROcodone-acetaminophen (NORCO)  MG per tablet One table every 6 hours as needed for pain. 100 tablet 0    Omega-3 350 MG CAPS Take 1 capsule by mouth daily.  30 capsule 3    Cholecalciferol (VITAMIN D3) 1000 UNITS TABS TAKE ONE TABLET BY MOUTH EVERY DAY 30 tablet 2    omeprazole (PRILOSEC) 20 MG delayed release capsule Take 1 capsule by mouth 2 times daily; take on an empty stomach and eat a meal or snack 45 to 60 minutes hour after each dose. (Patient not taking: Reported on 11/19/2020) 180 capsule 0    ferrous sulfate 325 (65 FE) MG tablet Take 325 mg by mouth daily (with breakfast)       No current facility-administered medications for this visit. Assessment and plan:     Essential hypertension     Vitals:    12/03/20 1256   BP: 108/76   Pulse: 93   SpO2: 95%     Home BP monitoring encourage with a BP goal <130/80  BP is well controlled. Continue current meds. Amlodipine 5mg daily    PAT (paroxysmal atrial tachycardia) (HCC)  EKG 12/03/20   Sinus Rhythm with Right Bundle  She has had no symptoms or indication that she has atrial tachycardia. Therefore we will just monitor her clinically and she can see me when she becomes symptomatic again. Hypercholesteremia  Lipitor 20 mg daily    Plan:  Follow up as needed. Thank you for allowing me to participate in the care of Iman Frankel. Further evaluation will be based upon the patient's clinical course and testing results. All questions and concerns were addressed to the patient/family. Alternatives to my treatment were discussed. I have discussed the above stated plan and the patient verbalized understanding and agreed with the plan. NOTE: This report was transcribed using voice recognition software. Every effort was made to ensure accuracy, however, inadvertent computerized transcription errors may be present. Brooke Dawson MD, Lalit Szymanskis [address] [facility]   Office: [phone]    Scribe attestation:  This note was scribed in the presence of Brooke Dawson MD by Alesia Douglas RN    IDr. Brooke personally performed the services described in this documentation as scribed by RN in my presence, and it is both accurate and complete.

## 2020-12-03 ENCOUNTER — OFFICE VISIT (OUTPATIENT)
Dept: CARDIOLOGY CLINIC | Age: 75
End: 2020-12-03
Payer: MEDICARE

## 2020-12-03 VITALS
HEART RATE: 93 BPM | DIASTOLIC BLOOD PRESSURE: 76 MMHG | OXYGEN SATURATION: 95 % | BODY MASS INDEX: 23.32 KG/M2 | WEIGHT: 131.6 LBS | HEIGHT: 63 IN | SYSTOLIC BLOOD PRESSURE: 108 MMHG

## 2020-12-03 PROCEDURE — 3017F COLORECTAL CA SCREEN DOC REV: CPT | Performed by: INTERNAL MEDICINE

## 2020-12-03 PROCEDURE — 93000 ELECTROCARDIOGRAM COMPLETE: CPT | Performed by: INTERNAL MEDICINE

## 2020-12-03 PROCEDURE — 99213 OFFICE O/P EST LOW 20 MIN: CPT | Performed by: INTERNAL MEDICINE

## 2020-12-03 PROCEDURE — 4040F PNEUMOC VAC/ADMIN/RCVD: CPT | Performed by: INTERNAL MEDICINE

## 2020-12-03 PROCEDURE — 1036F TOBACCO NON-USER: CPT | Performed by: INTERNAL MEDICINE

## 2020-12-03 PROCEDURE — G8420 CALC BMI NORM PARAMETERS: HCPCS | Performed by: INTERNAL MEDICINE

## 2020-12-03 PROCEDURE — G8484 FLU IMMUNIZE NO ADMIN: HCPCS | Performed by: INTERNAL MEDICINE

## 2020-12-03 PROCEDURE — 1123F ACP DISCUSS/DSCN MKR DOCD: CPT | Performed by: INTERNAL MEDICINE

## 2020-12-03 PROCEDURE — G8427 DOCREV CUR MEDS BY ELIG CLIN: HCPCS | Performed by: INTERNAL MEDICINE

## 2020-12-03 PROCEDURE — G8399 PT W/DXA RESULTS DOCUMENT: HCPCS | Performed by: INTERNAL MEDICINE

## 2020-12-03 PROCEDURE — 1090F PRES/ABSN URINE INCON ASSESS: CPT | Performed by: INTERNAL MEDICINE

## 2021-01-21 ENCOUNTER — HOSPITAL ENCOUNTER (OUTPATIENT)
Dept: WOMENS IMAGING | Age: 76
Discharge: HOME | End: 2021-01-21
Payer: MEDICARE

## 2021-01-21 DIAGNOSIS — Z12.31 VISIT FOR SCREENING MAMMOGRAM: ICD-10-CM

## 2021-01-21 PROCEDURE — 77063 BREAST TOMOSYNTHESIS BI: CPT

## 2021-01-28 ENCOUNTER — HOSPITAL ENCOUNTER (OUTPATIENT)
Age: 76
Discharge: HOME | End: 2021-01-28
Payer: MEDICARE

## 2021-01-28 ENCOUNTER — HOSPITAL ENCOUNTER (OUTPATIENT)
Dept: GENERAL RADIOLOGY | Age: 76
Discharge: HOME | End: 2021-01-28
Payer: MEDICARE

## 2021-01-28 DIAGNOSIS — M25.432 SWELLING OF LEFT WRIST: ICD-10-CM

## 2021-01-28 PROCEDURE — 73110 X-RAY EXAM OF WRIST: CPT

## 2021-08-24 NOTE — PROGRESS NOTES
2021  Patient Name: Saw Canales  : 1945  Medical Record: [id number]      ASSESSMENT AND PLAN    Assessment/Plan:      ASSESSMENT:    1. Rheumatoid arthritis involving multiple sites with positive rheumatoid factor ([facility]    2. Primary osteoarthritis involving multiple joints    3. Other fatigue    4. Encounter for screening for other viral diseases         PLAN:     Mikaela Kapadia was seen today for establish care. Diagnoses and all orders for this visit:    Rheumatoid arthritis involving multiple sites with positive rheumatoid factor (HCC)  -     CBC Auto Differential; Future  -     Comprehensive Metabolic Panel; Future  -     C-Reactive Protein; Future  -     Sedimentation Rate; Future  -     Cyclic Citrul Peptide Antibody, IgG; Future  -     Hepatitis B Core Antibody, IgM; Future  -     Hepatitis B Surface Antigen; Future  -     Hepatitis C Antibody; Future  -     TSH WITH REFLEX TO FT4; Future  -     XR HAND LEFT (MIN 3 VIEWS); Future  -     XR HAND RIGHT (MIN 3 VIEWS); Future  -     XR FOOT RIGHT (MIN 3 VIEWS); Future  -     XR FOOT LEFT (MIN 3 VIEWS); Future  -     Miscellaneous Sendout 1; Future  -     predniSONE (DELTASONE) 5 MG tablet; Take 3 tabs daily for 10 days and then 2 tabs daily    Primary osteoarthritis involving multiple joints  -     XR HAND LEFT (MIN 3 VIEWS); Future  -     XR HAND RIGHT (MIN 3 VIEWS); Future  -     XR FOOT RIGHT (MIN 3 VIEWS); Future  -     XR FOOT LEFT (MIN 3 VIEWS); Future    Other fatigue  -     TSH WITH REFLEX TO FT4; Future    Encounter for screening for other viral diseases   -     Hepatitis B Surface Antigen;  Future       , ESR 30, CRP 5.2, active synovitis on joint exam  Most likely rheumatoid arthritis mainly involving hands and wrists  And will do additional work-up to evaluate further for rheumatoid arthritis/systemic rheumatic disease  Check baseline CBC, CMP, hepatitis panel, anti-CCP antibody, RADU by IFA, hand and foot x-rays  Start prednisone 15 mg daily for 10 days and then 10 mg daily  DMARD therapy after doing blood work    The patient indicates understanding of these issues and agrees with the plan. Return in about 6 weeks (around 10/6/2021). The risks and benefits of my recommendations, as well as other treatment options, benefits and side effects werediscussed with the patient. All questions were answered. I reviewed patient's history, referral documents and electronic medical records  Copy of consult note is being routedelectronically/faxed to referring physician         MEDICATIONS  Current Outpatient Medications   Medication Sig Dispense Refill    amitriptyline (ELAVIL) 50 MG tablet       rOPINIRole HCl (REQUIP PO) Take by mouth      predniSONE (DELTASONE) 5 MG tablet Take 3 tabs daily for 10 days and then 2 tabs daily 70 tablet 1    calcium carbonate 600 MG TABS tablet Take 1 tablet by mouth daily 90 tablet 3    tiZANidine (ZANAFLEX) 4 MG tablet Take 4 mg by mouth every 6 hours as needed      aspirin 81 MG tablet Take 81 mg by mouth daily      atorvastatin (LIPITOR) 20 MG tablet Take 20 mg by mouth daily      cetirizine (ZYRTEC) 10 MG tablet Take 10 mg by mouth daily      linaclotide (LINZESS) 145 MCG capsule Take 145 mcg by mouth every morning (before breakfast)      amLODIPine (NORVASC) 5 MG tablet Take 5 mg by mouth daily       Multiple Vitamins-Minerals (CENTRUM SILVER PO) Take by mouth daily       zolpidem (AMBIEN) 10 MG tablet TAKE ONE TABLET BY MOUTH EVERY NIGHT AT BEDTIME AS NEEDED FOR SLEEP 30 tablet 0    HYDROcodone-acetaminophen (NORCO)  MG per tablet One table every 6 hours as needed for pain. 100 tablet 0    Omega-3 350 MG CAPS Take 1 capsule by mouth daily. 30 capsule 3    Cholecalciferol (VITAMIN D3) 1000 UNITS TABS TAKE ONE TABLET BY MOUTH EVERY DAY 30 tablet 2     No current facility-administered medications for this visit.        ALLERGIES  Allergies   Allergen Reactions    Morphine      40 yrs ago threw up after surgery don't know if it was morphine or the anesthesia         Comments  No specialty comments available. Joe Martinez MD    HISTORY OF PRESENT ILLNESS  Shaista Burton is a 68 y.o. female with past medical history of pulmonary embolism, anxiety, depression, chronic kidney disease, chronic pain, hypertension who is being seen for follow up evaluation of  joint pain. Symptoms started 1 year ago. It started in the left hand and spread to involve the right hand. She has been on daily basis without any significant living or aggravating factors. She describes her pain as aching 9 out of 10. She has swelling in the knuckles and wrists. She has morning stiffness for 2 hours but she has stiffness in the hands that can last all day long. She has difficulty opening doorknobs and jars. She is on Norco for chronic pain. She is unable to take oral NSAIDs due to chronic kidney disease. She has also tried topical analgesics with minimal benefit. She denies family history of rheumatoid arthritis. She denies psoriasis, inflammatory bowel disease, inflammatory back pain, dactylitis, enthesitis, tenosynovitis or uveitis. She denies fever, weight loss or swollen glands. She had a work-up with PCP that showed , ESR 30 and CRP 5.2. HPI  Review of Systems    REVIEW OF SYSTEMS: Positive for DVT, fatigue  Constitutional: No unanticipated weight loss or fevers. Integumentary: No rash, photosensitivity, malar rash, livedo reticularis, alopecia and Raynaud's symptoms, sclerodactyly, skin tightening  Eyes: negative for visual disturbance and persistent redness, discharge from eyes   ENT: - No tinnitus, loss of hearing, vertigo, or recurrent ear infections.  - No history of nasal/oral ulcers.   - No history of dry eyes/dry mouth  Cardiovascular: No history of pericarditis, chest pain or murmur or palpitations  Respiratory: No shortness of breath, cough or history of interstitial lung disease. No history of pleurisy. No history of tuberculosis or atypical infections. Gastrointestinal: No history of heart burn, dysphagia or esophageal dysmotility. No change in bowel habits or any inflammatory bowel disease. Genitourinary: No history of renal disease, miscarriages. Hematologic/Lymphatic: No abnormal bruising or bleeding,  swollen lymph nodes. Neurological: No history of headaches, seizure or focal weakness. No history of neuropathies, paresthesias or hyperesthesias, facial droop, diplopia  Psychiatric: No history of bipolar disease  Endocrine: Denies any polyuria, polydipsia and osteoporosis  Allergic/Immunologic: No nasal congestion or hives. I have reviewed patients Past medical History, Social History and Family History as mentioned in her chart and this remains unchanged fromprevious.     Past Medical History:   Diagnosis Date    Acute sinusitis 1/3/2014    Anemia     Back pain, acute 1/3/2014    Chest pain, non-cardiac 11/12/2013    CKD (chronic kidney disease) stage 3, GFR 30-59 ml/min ([facility]     Closed fracture of fourth thoracic vertebra ([facility])     COPD (chronic obstructive pulmonary disease) ([address] 3/5/2012    Counseling NOS 5/2/2013    Depression with anxiety     DVT (deep venous thrombosis) ([address]     Fracture of 5th metatarsal 9/6/2013    History of pulmonary embolism 6/13/2012    Hx of blood clots     Hypercholesteremia     Hypertension     Hypomagnesemia 6/13/2012    Increased MCV 6/13/2012    Insomnia secondary to situational depression 12/12/2012    Left sided abdominal pain 11/13/2013    Lumbar strain 3/27/2013    Muscle spasm     chronic    PE (pulmonary thromboembolism) 10/25/2011    Pulmonary embolism ([address]     Trigger finger 11/12/2013     Past Surgical History:   Procedure Laterality Date    ABDOMEN SURGERY      tummy tuck    ABDOMINOPLASTY  9/1/2011    ANKLE FRACTURE SURGERY Left 10-9/2015    BREAST ENHANCEMENT SURGERY 2011    CARPAL TUNNEL RELEASE      R    COLONOSCOPY N/A 10/9/2019    COLONOSCOPY POLYPECTOMY SNARE/COLD BIOPSY performed by Wilma Lawson MD at [address]  10/9/2019    COLONOSCOPY WITH BIOPSY performed by Wilma Lawson MD at [address]      Aug     HYSTERECTOMY      total-pre cancerous cells    KNEE SURGERY      R    LARYNGOSCOPY N/A 2020    DIRECT LARYNGOSCOPY AND EXCISION OF THE NODULE ON THE LEFT EPIGLOTTIS AND EXCISION  OF  CYST OF RIGHT EPIGLOTTIS ([address] or [address] or [address] or [phone]) performed by Shana Quezada MD at [place] [address] History     Socioeconomic History    Marital status:      Spouse name: Not on file    Number of children: Not on file    Years of education: Not on file    Highest education level: Not on file   Occupational History    Not on file   Tobacco Use    Smoking status: Former Smoker     Packs/day: 0.00     Years: 15.00     Pack years: 0.00     Types: Cigarettes     Quit date: 10/9/2012     Years since quittin.8    Smokeless tobacco: Never Used   Vaping Use    Vaping Use: Never used   Substance and Sexual Activity    Alcohol use: No     Alcohol/week: 20.0 standard drinks     Types: 24 Standard drinks or equivalent per week     Comment: [address] 10/10/2012    Drug use: No    Sexual activity: Not on file   Other Topics Concern    Not on file   Social History Narrative    Not on file     Social Determinants of Health     Financial Resource Strain:     Difficulty of Paying Living Expenses:    Food Insecurity:     Worried About Running Out of Food in the Last Year:     [address] in the Last Year:    Transportation Needs:     Lack of Transportation (Medical):      Lack of Transportation (Non-Medical):    Physical Activity:     Days of Exercise per Week:     Minutes of Exercise per Session:    Stress:     Feeling of Stress :    Social Connections: ROM, no tenderness,supple   Back- no tenderness. Eyes:  PERRL, extra ocular movements intact, conjunctiva normal   HEENT:  Atraumatic, normocephalic, external ears normal, oropharynx moist, no pharyngeal exudates. Respiratory:  No respiratory distress  GI:  Soft, nondistended, normal bowel sounds, nontender, noorganomegaly, no mass, no rebound, no guarding   :  No costovertebral angle tenderness   Integument:  Well hydrated, no rash or telangiectasias  Lymphatic:  No lymphadenopathy noted   Neurologic:   Alert & oriented x 3, CN 2-12 normal, no focal deficits noted. Sensations Intact. Muscle strength 5/5 proximally and distally in upper and lower extremities.    Psychiatric:  Speech and behavior appropriate           LABS AND IMAGING  Outside data reviewed and in HPI    Lab Results   Component Value Date    WBC 5.3 01/20/2019    RBC 3.99 01/20/2019    HGB 12.6 01/20/2019    HCT 37.9 01/20/2019     01/20/2019    MCV 94.9 01/20/2019    MCH 31.5 01/20/2019    MCHC 33.2 01/20/2019    RDW 14.5 01/20/2019    SEGSPCT 44.7 03/19/2013    LYMPHOPCT 24.7 01/20/2019    MONOPCT 5.1 01/20/2019    EOSPCT 6.1 10/26/2011    BASOPCT 0.8 01/20/2019    MONOSABS 0.3 01/20/2019    LYMPHSABS 1.3 01/20/2019    EOSABS 0.3 01/20/2019    BASOSABS 0.0 01/20/2019    DIFFTYPE Auto-K 03/19/2013       Chemistry        Component Value Date/Time     01/20/2019 1401    K 4.1 01/20/2019 1401     01/20/2019 1401    CO2 24 01/20/2019 1401    BUN 21 (H) 01/20/2019 1401    CREATININE 1.3 (H) 01/20/2019 1401        Component Value Date/Time    CALCIUM 9.4 01/20/2019 1401    ALKPHOS 62 11/06/2017 1212    AST 17 11/06/2017 1212    ALT 12 11/06/2017 1212    BILITOT 0.3 11/06/2017 1212          Lab Results   Component Value Date    SEDRATE 33 (H) 06/21/2012     No results found for: CRP  Lab Results   Component Value Date    RADU Negative 06/21/2012     No results found for: RF, CCPABIGG  Lab Results   Component Value Date    RADU Negative 06/21/2012     No results found for: DSDNAG, DSDNAIGGIFA  No results found for: [place]  No results found for: SMAB, RNPAB  No results found for: CENTABIGG  No results found for: C3, C4, ACE  Lab Results   Component Value Date    VITD25 62.4 11/19/2018     No results found for: David Barnes Results   Component Value Date    KPFQIHEM04 293 06/14/2012     Lab Results   Component Value Date    TSH 2.84 08/31/2020     Lab Results   Component Value Date    VITD25 62.4 11/19/2018     Data reviewed: April 2021    ESR 30  CRP 5.2  Creatinine 1.4  RADU negative      ######################################################################    I thank you for giving me theopportunity to participate in [place] [facility] care. If you have any questions or concerns please feel free to contact me. I look forward to following  Lenora Bridget along with you. Electronically signed by: Ilia Marsh MD, MD, 8/25/2021 10:35 AM    Documentation was done using voice recognition dragon software. Every effort was made to ensure accuracy;however, inadvertent unintentional computerized transcription errors may be present.

## 2021-08-25 ENCOUNTER — OFFICE VISIT (OUTPATIENT)
Dept: RHEUMATOLOGY | Age: 76
End: 2021-08-25
Payer: MEDICARE

## 2021-08-25 ENCOUNTER — HOSPITAL ENCOUNTER (OUTPATIENT)
Dept: GENERAL RADIOLOGY | Age: 76
Discharge: HOME | End: 2021-08-25
Payer: MEDICARE

## 2021-08-25 ENCOUNTER — HOSPITAL ENCOUNTER (OUTPATIENT)
Age: 76
Discharge: HOME | End: 2021-08-25
Payer: MEDICARE

## 2021-08-25 VITALS
WEIGHT: 144 LBS | SYSTOLIC BLOOD PRESSURE: 120 MMHG | BODY MASS INDEX: 25.92 KG/M2 | HEART RATE: 80 BPM | DIASTOLIC BLOOD PRESSURE: 82 MMHG

## 2021-08-25 DIAGNOSIS — M05.79 RHEUMATOID ARTHRITIS INVOLVING MULTIPLE SITES WITH POSITIVE RHEUMATOID FACTOR (HCC): Primary | ICD-10-CM

## 2021-08-25 DIAGNOSIS — M15.9 PRIMARY OSTEOARTHRITIS INVOLVING MULTIPLE JOINTS: ICD-10-CM

## 2021-08-25 DIAGNOSIS — M05.79 RHEUMATOID ARTHRITIS INVOLVING MULTIPLE SITES WITH POSITIVE RHEUMATOID FACTOR (HCC): ICD-10-CM

## 2021-08-25 DIAGNOSIS — R53.83 OTHER FATIGUE: ICD-10-CM

## 2021-08-25 DIAGNOSIS — Z11.59 ENCOUNTER FOR SCREENING FOR OTHER VIRAL DISEASES: ICD-10-CM

## 2021-08-25 PROCEDURE — 73630 X-RAY EXAM OF FOOT: CPT

## 2021-08-25 PROCEDURE — G8417 CALC BMI ABV UP PARAM F/U: HCPCS | Performed by: INTERNAL MEDICINE

## 2021-08-25 PROCEDURE — 1090F PRES/ABSN URINE INCON ASSESS: CPT | Performed by: INTERNAL MEDICINE

## 2021-08-25 PROCEDURE — 73130 X-RAY EXAM OF HAND: CPT

## 2021-08-25 PROCEDURE — G8399 PT W/DXA RESULTS DOCUMENT: HCPCS | Performed by: INTERNAL MEDICINE

## 2021-08-25 PROCEDURE — 99204 OFFICE O/P NEW MOD 45 MIN: CPT | Performed by: INTERNAL MEDICINE

## 2021-08-25 PROCEDURE — 1123F ACP DISCUSS/DSCN MKR DOCD: CPT | Performed by: INTERNAL MEDICINE

## 2021-08-25 PROCEDURE — 1036F TOBACCO NON-USER: CPT | Performed by: INTERNAL MEDICINE

## 2021-08-25 PROCEDURE — 4040F PNEUMOC VAC/ADMIN/RCVD: CPT | Performed by: INTERNAL MEDICINE

## 2021-08-25 PROCEDURE — G8427 DOCREV CUR MEDS BY ELIG CLIN: HCPCS | Performed by: INTERNAL MEDICINE

## 2021-08-25 RX ORDER — PREDNISONE 1 MG/1
TABLET ORAL
Qty: 70 TABLET | Refills: 1 | Status: SHIPPED | OUTPATIENT
Start: 2021-08-25 | End: 2021-11-29

## 2021-08-25 RX ORDER — AMITRIPTYLINE HYDROCHLORIDE 50 MG/1
TABLET, FILM COATED ORAL
COMMUNITY
Start: 2021-07-22

## 2021-08-25 NOTE — LETTER
[facility] Rheumatology  [id number]  Phone: [phone]  Fax: [phone]    Beth Washington MD        August 25, 2021     Denys Lucero, [address] [place], [place] [address]Third [facility]  [place] [address]    Patient: Esther Hatfield  MR Number: [id number]  YOB: 1945  Date of Visit: 8/25/2021    Dear Dr. Garo Pepe    Thank you for your referral. Progress note attached in visit summary. If you have questions, please do not hesitate to call me. I look forward to following Rosen Enoch along with you.     Sincerely,        Beth Washington MD

## 2021-11-19 ENCOUNTER — HOSPITAL ENCOUNTER (OUTPATIENT)
Dept: NON INVASIVE DIAGNOSTICS | Age: 76
Discharge: HOME | End: 2021-11-19
Payer: MEDICARE

## 2021-11-19 DIAGNOSIS — R55 SYNCOPE AND COLLAPSE: ICD-10-CM

## 2021-11-19 LAB
LV EF: 58 %
LVEF MODALITY: NORMAL

## 2021-11-19 PROCEDURE — 93306 TTE W/DOPPLER COMPLETE: CPT

## 2021-11-29 ENCOUNTER — OFFICE VISIT (OUTPATIENT)
Dept: RHEUMATOLOGY | Age: 76
End: 2021-11-29
Payer: MEDICARE

## 2021-11-29 VITALS
DIASTOLIC BLOOD PRESSURE: 80 MMHG | HEART RATE: 82 BPM | WEIGHT: 145.2 LBS | BODY MASS INDEX: 26.13 KG/M2 | SYSTOLIC BLOOD PRESSURE: 122 MMHG

## 2021-11-29 DIAGNOSIS — Z79.899 HIGH RISK MEDICATION USE: ICD-10-CM

## 2021-11-29 DIAGNOSIS — M05.79 RHEUMATOID ARTHRITIS INVOLVING MULTIPLE SITES WITH POSITIVE RHEUMATOID FACTOR (HCC): Primary | ICD-10-CM

## 2021-11-29 DIAGNOSIS — M15.9 PRIMARY OSTEOARTHRITIS INVOLVING MULTIPLE JOINTS: ICD-10-CM

## 2021-11-29 LAB
ALT SERPL-CCNC: 16 U/L (ref 10–40)
AST SERPL-CCNC: 19 U/L (ref 15–37)
BASOPHILS ABSOLUTE: 0.1 K/UL (ref 0–0.2)
BASOPHILS RELATIVE PERCENT: 0.8 %
CREAT SERPL-MCNC: 1.3 MG/DL (ref 0.6–1.2)
EOSINOPHILS ABSOLUTE: 0.5 K/UL (ref 0–0.6)
EOSINOPHILS RELATIVE PERCENT: 6 %
GFR AFRICAN AMERICAN: 48
GFR NON-AFRICAN AMERICAN: 40
HCT VFR BLD CALC: 40.9 % (ref 36–48)
HEMOGLOBIN: 13.2 G/DL (ref 12–16)
LYMPHOCYTES ABSOLUTE: 1.5 K/UL (ref 1–5.1)
LYMPHOCYTES RELATIVE PERCENT: 20.4 %
MCH RBC QN AUTO: 30.2 PG (ref 26–34)
MCHC RBC AUTO-ENTMCNC: 32.3 G/DL (ref 31–36)
MCV RBC AUTO: 93.7 FL (ref 80–100)
MONOCYTES ABSOLUTE: 0.5 K/UL (ref 0–1.3)
MONOCYTES RELATIVE PERCENT: 7.2 %
NEUTROPHILS ABSOLUTE: 4.9 K/UL (ref 1.7–7.7)
NEUTROPHILS RELATIVE PERCENT: 65.6 %
PDW BLD-RTO: 16.1 % (ref 12.4–15.4)
PLATELET # BLD: 193 K/UL (ref 135–450)
PMV BLD AUTO: 9.9 FL (ref 5–10.5)
RBC # BLD: 4.37 M/UL (ref 4–5.2)
WBC # BLD: 7.5 K/UL (ref 4–11)

## 2021-11-29 PROCEDURE — 99214 OFFICE O/P EST MOD 30 MIN: CPT | Performed by: INTERNAL MEDICINE

## 2021-11-29 PROCEDURE — G8417 CALC BMI ABV UP PARAM F/U: HCPCS | Performed by: INTERNAL MEDICINE

## 2021-11-29 PROCEDURE — 4040F PNEUMOC VAC/ADMIN/RCVD: CPT | Performed by: INTERNAL MEDICINE

## 2021-11-29 PROCEDURE — 1123F ACP DISCUSS/DSCN MKR DOCD: CPT | Performed by: INTERNAL MEDICINE

## 2021-11-29 PROCEDURE — 1090F PRES/ABSN URINE INCON ASSESS: CPT | Performed by: INTERNAL MEDICINE

## 2021-11-29 PROCEDURE — G8484 FLU IMMUNIZE NO ADMIN: HCPCS | Performed by: INTERNAL MEDICINE

## 2021-11-29 PROCEDURE — G8427 DOCREV CUR MEDS BY ELIG CLIN: HCPCS | Performed by: INTERNAL MEDICINE

## 2021-11-29 PROCEDURE — 1036F TOBACCO NON-USER: CPT | Performed by: INTERNAL MEDICINE

## 2021-11-29 PROCEDURE — G8399 PT W/DXA RESULTS DOCUMENT: HCPCS | Performed by: INTERNAL MEDICINE

## 2021-11-29 RX ORDER — PREDNISONE 1 MG/1
TABLET ORAL
Qty: 105 TABLET | Refills: 0 | Status: SHIPPED | OUTPATIENT
Start: 2021-11-29 | End: 2022-02-08 | Stop reason: SDUPTHER

## 2021-11-29 RX ORDER — LEFLUNOMIDE 20 MG/1
20 TABLET ORAL DAILY
Qty: 30 TABLET | Refills: 1 | Status: SHIPPED | OUTPATIENT
Start: 2021-11-29 | End: 2022-02-08 | Stop reason: SDUPTHER

## 2021-11-29 NOTE — PROGRESS NOTES
2021  Patient Name: David Kelly  : 1945  Medical Record: [id number]      ASSESSMENT AND PLAN    Assessment/Plan:      ASSESSMENT:    1. Rheumatoid arthritis involving multiple sites with positive rheumatoid factor ([facility] [address]    2. Primary osteoarthritis involving multiple joints    3. High risk medication use        PLAN:     Gian Newberry was seen today for follow-up. Diagnoses and all orders for this visit:    Rheumatoid arthritis involving multiple sites with positive rheumatoid factor (HCC)  -     leflunomide (ARAVA) 20 MG tablet; Take 1 tablet by mouth daily  -     predniSONE (DELTASONE) 5 MG tablet; Take 3 tabs daily for 10 days,2.5 tabs for 10 days,2 tabs for 10 days,1.5 tab for 10 days,1 tab for 10 days, 0.5 tab for 10 days and stop    Primary osteoarthritis involving multiple joints    High risk medication use  -     ALT; Standing  -     AST; Standing  -     CBC Auto Differential; Standing  -     Creatinine, Serum; Standing       , anti-CCP negative, ESR 30, CRP 5.2, active synovitis on joint exam  Left wrist x-ray with erosions in the triquetrum  Reports significant improvement on prednisone  Symptoms worsen off of prednisone  I will start leflunomide 20 mg daily  Restart prednisone taper  Labs every 4 weeks  Recommend pneumonia and shingles vaccine. She is up-to-date with Covid vaccine    Leflunomide can cause nausea/vomiting, diarrhea, elevated liver enzymes, bone marrow toxicity, rare cases of interstitial lung diseases, birth defects and were explained in detail to the patient. We have to check blood work every month for first 3 months and then every 2-3 months    The patient indicates understanding of these issues and agrees with the plan. Return in about 8 weeks (around 2022). The risks and benefits of my recommendations, as well as other treatment options, benefits and side effects werediscussed with the patient. All questions were answered. 1 year ago. It started in the left hand and spread to involve the right hand. She has been on daily basis without any significant living or aggravating factors. She describes her pain as aching 9 out of 10. She has swelling in the knuckles and wrists. She has morning stiffness for 2 hours but she has stiffness in the hands that can last all day long. She has difficulty opening doorknobs and jars. She is on Norco for chronic pain. She is unable to take oral NSAIDs due to chronic kidney disease. She has also tried topical analgesics with minimal benefit. She denies family history of rheumatoid arthritis. She denies psoriasis, inflammatory bowel disease, inflammatory back pain, dactylitis, enthesitis, tenosynovitis or uveitis. She denies fever, weight loss or swollen glands. She had a work-up with PCP that showed , ESR 30 and CRP 5.2. Interim history: She presents for follow-up of rheumatoid arthritis, osteoarthritis. She was last seen in August.  She stayed on prednisone 10 mg daily. She has been off of it for past 1 week and her joint symptoms have gotten worse especially in her feet and left wrist.  Blood work showed positive RF [166], negative anti-CCP antibody with normal ESR and CRP. Hand and foot x-rays show changes consistent with degenerative arthritis and erosions in the left wrist triquetrum. She denies any side effects with the medications. She denies any recent fevers or infections. HPI  Review of Systems    REVIEW OF SYSTEMS: Positive for DVT, fatigue  Constitutional: No unanticipated weight loss or fevers. Integumentary: No rash, photosensitivity, malar rash, livedo reticularis, alopecia and Raynaud's symptoms, sclerodactyly, skin tightening  Eyes: negative for visual disturbance and persistent redness, discharge from eyes   ENT: - No tinnitus, loss of hearing, vertigo, or recurrent ear infections.  - No history of nasal/oral ulcers.   - No history of dry eyes/dry mouth  Cardiovascular: No history of pericarditis, chest pain or murmur or palpitations  Respiratory: No shortness of breath, cough or history of interstitial lung disease. No history of pleurisy. No history of tuberculosis or atypical infections. Gastrointestinal: No history of heart burn, dysphagia or esophageal dysmotility. No change in bowel habits or any inflammatory bowel disease. Genitourinary: No history of renal disease, miscarriages. Hematologic/Lymphatic: No abnormal bruising or bleeding,  swollen lymph nodes. Neurological: No history of headaches, seizure or focal weakness. No history of neuropathies, paresthesias or hyperesthesias, facial droop, diplopia  Psychiatric: No history of bipolar disease  Endocrine: Denies any polyuria, polydipsia and osteoporosis  Allergic/Immunologic: No nasal congestion or hives. I have reviewed patients Past medical History, Social History and Family History as mentioned in her chart and this remains unchanged fromprevious.     Past Medical History:   Diagnosis Date    Acute sinusitis 1/3/2014    Anemia     Back pain, acute 1/3/2014    Chest pain, non-cardiac 11/12/2013    CKD (chronic kidney disease) stage 3, GFR 30-59 ml/min ([facility])     Closed fracture of fourth thoracic vertebra (HCC)     COPD (chronic obstructive pulmonary disease) ([address] 3/5/2012    Counseling NOS 5/2/2013    Depression with anxiety     DVT (deep venous thrombosis) ([address]     Fracture of 5th metatarsal 9/6/2013    History of pulmonary embolism 6/13/2012    Hx of blood clots     Hypercholesteremia     Hypertension     Hypomagnesemia 6/13/2012    Increased MCV 6/13/2012    Insomnia secondary to situational depression 12/12/2012    Left sided abdominal pain 11/13/2013    Lumbar strain 3/27/2013    Muscle spasm     chronic    PE (pulmonary thromboembolism) 10/25/2011    Pulmonary embolism ([address]     Trigger finger 11/12/2013     Past Surgical History:   Procedure Laterality Date    ABDOMEN SURGERY      tummy tuck    ABDOMINOPLASTY  2011    ANKLE FRACTURE SURGERY Left     BREAST ENHANCEMENT SURGERY      2011    CARPAL TUNNEL RELEASE      R    COLONOSCOPY N/A 10/9/2019    COLONOSCOPY POLYPECTOMY SNARE/COLD BIOPSY performed by Zane Godinez MD at [facility] COLONOSCOPY  10/9/2019    COLONOSCOPY WITH BIOPSY performed by Zane Godinez MD at [facility]      Aug     HYSTERECTOMY      total-pre cancerous cells    KNEE SURGERY      R    LARYNGOSCOPY N/A 2020    DIRECT LARYNGOSCOPY AND EXCISION OF THE NODULE ON THE LEFT EPIGLOTTIS AND EXCISION  OF  CYST OF RIGHT EPIGLOTTIS ([address] or [id number] or [id number] or [phone]) performed by Kehinde Arroyo MD at [place] [address] History     Socioeconomic History    Marital status:      Spouse name: Not on file    Number of children: Not on file    Years of education: Not on file    Highest education level: Not on file   Occupational History    Not on file   Tobacco Use    Smoking status: Former Smoker     Packs/day: 0.00     Years: 15.00     Pack years: 0.00     Types: Cigarettes     Quit date: 10/9/2012     Years since quittin.1    Smokeless tobacco: Never Used   Vaping Use    Vaping Use: Never used   Substance and Sexual Activity    Alcohol use: No     Alcohol/week: 20.0 standard drinks     Types: 24 Standard drinks or equivalent per week     Comment: [facility] 10/10/2012    Drug use: No    Sexual activity: Not on file   Other Topics Concern    Not on file   Social History Narrative    Not on file     Social Determinants of Health     Financial Resource Strain:     Difficulty of Paying Living Expenses: Not on file   Food Insecurity:     Worried About Running Out of Food in the Last Year: Not on file    Macario of Food in the Last Year: Not on file   Transportation Needs:     Lack of Transportation (Medical):  Not on file FULL    Knee  0  0   crepitus/varus  0  0   crepitus/varus   Ankle  0  0  FULL   0  0  FULL    MTP1  0  0  FULL   0  0  FULL    MTP2  + + FULL   + + FULL    MTP3  + + FULL   + + FULL    MTP4  + + FULL   + + FULL    MTP5  + + FULL   + + FULL    IP1  0  0  FULL   0  0  FULL    IP2  0  0  FULL   0  0  FULL    IP3  0  0  FULL   0  0  FULL    IP4  0  0  FULL   0  0  FULL    IP5  0  0  FULL   0 0 FULL     Decreased flexion and extension in bilateral wrists    Ambulates without assistance, normal gait  Neck: Full ROM, no tenderness,supple   Back- no tenderness. Eyes:  PERRL, extra ocular movements intact, conjunctiva normal   HEENT:  Atraumatic, normocephalic, external ears normal, oropharynx moist, no pharyngeal exudates. Respiratory:  No respiratory distress  GI:  Soft, nondistended, normal bowel sounds, nontender, noorganomegaly, no mass, no rebound, no guarding   :  No costovertebral angle tenderness   Integument:  Well hydrated, no rash or telangiectasias  Lymphatic:  No lymphadenopathy noted   Neurologic:   Alert & oriented x 3, CN 2-12 normal, no focal deficits noted. Sensations Intact. Muscle strength 5/5 proximally and distally in upper and lower extremities.    Psychiatric:  Speech and behavior appropriate           LABS AND IMAGING  Outside data reviewed and in HPI    Lab Results   Component Value Date    WBC 7.4 08/25/2021    RBC 4.02 08/25/2021    HGB 12.5 08/25/2021    HCT 36.9 08/25/2021     08/25/2021    MCV 91.8 08/25/2021    MCH 31.0 08/25/2021    MCHC 33.7 08/25/2021    RDW 15.3 08/25/2021    SEGSPCT 44.7 03/19/2013    LYMPHOPCT 23.5 08/25/2021    MONOPCT 8.1 08/25/2021    EOSPCT 6.1 10/26/2011    BASOPCT 0.5 08/25/2021    MONOSABS 0.6 08/25/2021    LYMPHSABS 1.7 08/25/2021    EOSABS 0.6 08/25/2021    BASOSABS 0.0 08/25/2021    DIFFTYPE Auto-K 03/19/2013       Chemistry        Component Value Date/Time     08/25/2021 1113    K 4.5 08/25/2021 1113    K 4.1 01/20/2019 1401    CL 99 08/25/2021 1113    CO2 26 08/25/2021 1113    BUN 23 (H) 08/25/2021 1113    CREATININE 1.3 (H) 08/25/2021 1113        Component Value Date/Time    CALCIUM 10.4 08/25/2021 1113    ALKPHOS 84 08/25/2021 1113    AST 17 08/25/2021 1113    ALT 12 08/25/2021 1113    BILITOT <0.2 08/25/2021 1113          Lab Results   Component Value Date    SEDRATE 29 08/25/2021     Lab Results   Component Value Date    CRP 3.3 08/25/2021     Lab Results   Component Value Date    RADU Negative 06/21/2012     No results found for: RF, CCPABIGG  Lab Results   Component Value Date    RADU Negative 06/21/2012     No results found for: DSDNAG, DSDNAIGGIFA  No results found for: SSAROAB, SSALAAB  No results found for: SMAB, RNPAB  No results found for: CENTABIGG  No results found for: C3, C4, ACE  Lab Results   Component Value Date    VITD25 62.4 11/19/2018     No results found for: Austine Levo Results   Component Value Date    HJLVDXNA77 293 06/14/2012     Lab Results   Component Value Date    TSHFT4 2.37 08/25/2021    TSH 2.84 08/31/2020     Lab Results   Component Value Date    VITD25 62.4 11/19/2018     Data reviewed: April 2021    ESR 30  CRP 5.2  Creatinine 1.4  RADU negative  Bilateral hand and foot x-rays August 2021  1. Large osseous erosion in the left wrist triquetrum new from 01/28/2021. This is compatible with the given history of rheumatoid arthritis. 2. No evidence of inflammatory arthritis in the right hand or bilateral feet. 3. Mild degenerative changes of the bilateral hands and feet as above.           ######################################################################    I thank you for giving me theopportunity to participate in [facility]. If you have any questions or concerns please feel free to contact me. I look forward to following  Kaley Pitts along with you.       Electronically signed by: Pat Hanks MD, MD, 11/29/2021 1:47 PM    Documentation was done using voice recognition dragon software. Every effort was made to ensure accuracy;however, inadvertent unintentional computerized transcription errors may be present.

## 2022-02-08 ENCOUNTER — OFFICE VISIT (OUTPATIENT)
Dept: RHEUMATOLOGY | Age: 77
End: 2022-02-08
Payer: MEDICARE

## 2022-02-08 VITALS
WEIGHT: 142 LBS | SYSTOLIC BLOOD PRESSURE: 124 MMHG | BODY MASS INDEX: 25.56 KG/M2 | HEART RATE: 76 BPM | DIASTOLIC BLOOD PRESSURE: 80 MMHG

## 2022-02-08 DIAGNOSIS — Z79.899 HIGH RISK MEDICATION USE: ICD-10-CM

## 2022-02-08 DIAGNOSIS — M15.9 PRIMARY OSTEOARTHRITIS INVOLVING MULTIPLE JOINTS: ICD-10-CM

## 2022-02-08 DIAGNOSIS — M05.79 RHEUMATOID ARTHRITIS INVOLVING MULTIPLE SITES WITH POSITIVE RHEUMATOID FACTOR (HCC): Primary | ICD-10-CM

## 2022-02-08 LAB
ALT SERPL-CCNC: 19 U/L (ref 10–40)
AST SERPL-CCNC: 18 U/L (ref 15–37)
BASOPHILS ABSOLUTE: 0.1 K/UL (ref 0–0.2)
BASOPHILS RELATIVE PERCENT: 1.2 %
CREAT SERPL-MCNC: 1.3 MG/DL (ref 0.6–1.2)
EOSINOPHILS ABSOLUTE: 0.2 K/UL (ref 0–0.6)
EOSINOPHILS RELATIVE PERCENT: 2.3 %
GFR AFRICAN AMERICAN: 48
GFR NON-AFRICAN AMERICAN: 40
HCT VFR BLD CALC: 43.7 % (ref 36–48)
HEMOGLOBIN: 14.2 G/DL (ref 12–16)
LYMPHOCYTES ABSOLUTE: 2.2 K/UL (ref 1–5.1)
LYMPHOCYTES RELATIVE PERCENT: 24.7 %
MCH RBC QN AUTO: 30.5 PG (ref 26–34)
MCHC RBC AUTO-ENTMCNC: 32.5 G/DL (ref 31–36)
MCV RBC AUTO: 93.9 FL (ref 80–100)
MONOCYTES ABSOLUTE: 0.7 K/UL (ref 0–1.3)
MONOCYTES RELATIVE PERCENT: 7.8 %
NEUTROPHILS ABSOLUTE: 5.6 K/UL (ref 1.7–7.7)
NEUTROPHILS RELATIVE PERCENT: 64 %
PDW BLD-RTO: 15.5 % (ref 12.4–15.4)
PLATELET # BLD: 261 K/UL (ref 135–450)
PMV BLD AUTO: 9.7 FL (ref 5–10.5)
RBC # BLD: 4.66 M/UL (ref 4–5.2)
WBC # BLD: 8.8 K/UL (ref 4–11)

## 2022-02-08 PROCEDURE — 1123F ACP DISCUSS/DSCN MKR DOCD: CPT | Performed by: INTERNAL MEDICINE

## 2022-02-08 PROCEDURE — G8417 CALC BMI ABV UP PARAM F/U: HCPCS | Performed by: INTERNAL MEDICINE

## 2022-02-08 PROCEDURE — 1036F TOBACCO NON-USER: CPT | Performed by: INTERNAL MEDICINE

## 2022-02-08 PROCEDURE — 1090F PRES/ABSN URINE INCON ASSESS: CPT | Performed by: INTERNAL MEDICINE

## 2022-02-08 PROCEDURE — 99214 OFFICE O/P EST MOD 30 MIN: CPT | Performed by: INTERNAL MEDICINE

## 2022-02-08 PROCEDURE — G8427 DOCREV CUR MEDS BY ELIG CLIN: HCPCS | Performed by: INTERNAL MEDICINE

## 2022-02-08 PROCEDURE — G8484 FLU IMMUNIZE NO ADMIN: HCPCS | Performed by: INTERNAL MEDICINE

## 2022-02-08 PROCEDURE — 4040F PNEUMOC VAC/ADMIN/RCVD: CPT | Performed by: INTERNAL MEDICINE

## 2022-02-08 PROCEDURE — G8399 PT W/DXA RESULTS DOCUMENT: HCPCS | Performed by: INTERNAL MEDICINE

## 2022-02-08 RX ORDER — LEFLUNOMIDE 20 MG/1
20 TABLET ORAL DAILY
Qty: 30 TABLET | Refills: 1 | Status: SHIPPED | OUTPATIENT
Start: 2022-02-08 | End: 2022-03-21

## 2022-02-08 RX ORDER — PREDNISONE 1 MG/1
TABLET ORAL
Qty: 50 TABLET | Refills: 0 | Status: SHIPPED | OUTPATIENT
Start: 2022-02-08 | End: 2022-02-08 | Stop reason: SDUPTHER

## 2022-02-08 RX ORDER — LEFLUNOMIDE 20 MG/1
20 TABLET ORAL DAILY
Qty: 30 TABLET | Refills: 1 | Status: SHIPPED | OUTPATIENT
Start: 2022-02-08 | End: 2022-02-08 | Stop reason: SDUPTHER

## 2022-02-08 RX ORDER — PREDNISONE 1 MG/1
TABLET ORAL
Qty: 50 TABLET | Refills: 0 | Status: SHIPPED | OUTPATIENT
Start: 2022-02-08 | End: 2022-04-11

## 2022-02-08 NOTE — PATIENT INSTRUCTIONS
-     predniSONE (DELTASONE) 5 MG tablet; 2 tabs for 10 days,1.5 tab for 10 days,1 tab for 10 days, 0.5 tab for 10 days and stop  -     leflunomide (ARAVA) 20 MG tablet;  Take 1 tablet by mouth daily

## 2022-02-08 NOTE — PROGRESS NOTES
2022  Patient Name: Abhi Arciniega  : 1945  Medical Record: [id number]      ASSESSMENT AND PLAN    Assessment/Plan:      ASSESSMENT:    1. Rheumatoid arthritis involving multiple sites with positive rheumatoid factor ([facility]    2. High risk medication use    3. Primary osteoarthritis involving multiple joints        PLAN:     Haja Geiger was seen today for follow-up. Diagnoses and all orders for this visit:    Rheumatoid arthritis involving multiple sites with positive rheumatoid factor (HCC)  -     Discontinue: predniSONE (DELTASONE) 5 MG tablet; 2 tabs for 10 days,1.5 tab for 10 days,1 tab for 10 days, 0.5 tab for 10 days and stop  -     Discontinue: leflunomide (ARAVA) 20 MG tablet; Take 1 tablet by mouth daily  -     predniSONE (DELTASONE) 5 MG tablet; 2 tabs for 10 days,1.5 tab for 10 days,1 tab for 10 days, 0.5 tab for 10 days and stop  -     leflunomide (ARAVA) 20 MG tablet; Take 1 tablet by mouth daily    High risk medication use    Primary osteoarthritis involving multiple joints       , anti-CCP negative, ESR 30, CRP 5.2, active synovitis on joint exam  Left wrist x-ray with erosions in the triquetrum  Reports significant improvement on prednisone  Symptoms worsen off of prednisone  I will start leflunomide 20 mg daily  Restart prednisone taper  Labs every 4 weeks  Recommend pneumonia and shingles vaccine. She is up-to-date with Covid vaccine    Leflunomide can cause nausea/vomiting, diarrhea, elevated liver enzymes, bone marrow toxicity, rare cases of interstitial lung diseases, birth defects and were explained in detail to the patient. We have to check blood work every month for first 3 months and then every 2-3 months    The patient indicates understanding of these issues and agrees with the plan. Return in about 3 months (around 2022).       The risks and benefits of my recommendations, as well as other treatment options, benefits and side effects werediscussed with the patient. All questions were answered. MEDICATIONS  Current Outpatient Medications   Medication Sig Dispense Refill    predniSONE (DELTASONE) 5 MG tablet 2 tabs for 10 days,1.5 tab for 10 days,1 tab for 10 days, 0.5 tab for 10 days and stop 50 tablet 0    leflunomide (ARAVA) 20 MG tablet Take 1 tablet by mouth daily 30 tablet 1    amitriptyline (ELAVIL) 50 MG tablet       rOPINIRole HCl (REQUIP PO) Take by mouth      calcium carbonate 600 MG TABS tablet Take 1 tablet by mouth daily 90 tablet 3    tiZANidine (ZANAFLEX) 4 MG tablet Take 4 mg by mouth every 6 hours as needed      aspirin 81 MG tablet Take 81 mg by mouth daily      atorvastatin (LIPITOR) 20 MG tablet Take 20 mg by mouth daily      cetirizine (ZYRTEC) 10 MG tablet Take 10 mg by mouth daily      linaclotide (LINZESS) 145 MCG capsule Take 145 mcg by mouth every morning (before breakfast)      amLODIPine (NORVASC) 5 MG tablet Take 5 mg by mouth daily       Multiple Vitamins-Minerals (CENTRUM SILVER PO) Take by mouth daily       zolpidem (AMBIEN) 10 MG tablet TAKE ONE TABLET BY MOUTH EVERY NIGHT AT BEDTIME AS NEEDED FOR SLEEP 30 tablet 0    HYDROcodone-acetaminophen (NORCO)  MG per tablet One table every 6 hours as needed for pain. 100 tablet 0    Omega-3 350 MG CAPS Take 1 capsule by mouth daily. 30 capsule 3    Cholecalciferol (VITAMIN D3) 1000 UNITS TABS TAKE ONE TABLET BY MOUTH EVERY DAY 30 tablet 2     No current facility-administered medications for this visit. ALLERGIES  Allergies   Allergen Reactions    Morphine      40 yrs ago threw up after surgery don't know if it was morphine or the anesthesia         Comments  No specialty comments available. Background history:  Albert Mcgill is a 68 y.o. female with past medical history of pulmonary embolism, anxiety, depression, chronic kidney disease, chronic pain, hypertension who is being seen for follow up evaluation of  joint pain. Symptoms started 1 year ago. It started in the left hand and spread to involve the right hand. She has been on daily basis without any significant living or aggravating factors. She describes her pain as aching 9 out of 10. She has swelling in the knuckles and wrists. She has morning stiffness for 2 hours but she has stiffness in the hands that can last all day long. She has difficulty opening doorknobs and jars. She is on Norco for chronic pain. She is unable to take oral NSAIDs due to chronic kidney disease. She has also tried topical analgesics with minimal benefit. She denies family history of rheumatoid arthritis. She denies psoriasis, inflammatory bowel disease, inflammatory back pain, dactylitis, enthesitis, tenosynovitis or uveitis. She denies fever, weight loss or swollen glands. She had a work-up with PCP that showed , ESR 30 and CRP 5.2. Interim history: She presents for follow-up of rheumatoid arthritis, osteoarthritis. She is on leflunomide 20 mg daily and prednisone 10 mg daily. She started leflunomide 20 months ago along with prednisone. She had stomach pain on prednisone and leflunomide so she stopped taking both of these medications. She is back on prednisone and leflunomide for past 4 days. She is taking both meds with food without experiencing stomach pain. She denies any other self blood work showed positive RF [166], negative anti-CCP antibody with normal ESR and CRP. Hand and foot x-rays show changes consistent with degenerative arthritis and erosions in the left wrist triquetrum. She denies any recent fevers or infections. HPI  Review of Systems    REVIEW OF SYSTEMS: Positive for DVT, fatigue  Constitutional: No unanticipated weight loss or fevers.    Integumentary: No rash, photosensitivity, malar rash, livedo reticularis, alopecia and Raynaud's symptoms, sclerodactyly, skin tightening  Eyes: negative for visual disturbance and persistent redness, discharge from eyes   ENT: - No tinnitus, loss of hearing, vertigo, or recurrent ear infections.  - No history of nasal/oral ulcers. - No history of dry eyes/dry mouth  Cardiovascular: No history of pericarditis, chest pain or murmur or palpitations  Respiratory: No shortness of breath, cough or history of interstitial lung disease. No history of pleurisy. No history of tuberculosis or atypical infections. Gastrointestinal: No history of heart burn, dysphagia or esophageal dysmotility. No change in bowel habits or any inflammatory bowel disease. Genitourinary: No history of renal disease, miscarriages. Hematologic/Lymphatic: No abnormal bruising or bleeding,  swollen lymph nodes. Neurological: No history of headaches, seizure or focal weakness. No history of neuropathies, paresthesias or hyperesthesias, facial droop, diplopia  Psychiatric: No history of bipolar disease  Endocrine: Denies any polyuria, polydipsia and osteoporosis  Allergic/Immunologic: No nasal congestion or hives. I have reviewed patients Past medical History, Social History and Family History as mentioned in her chart and this remains unchanged fromprevious.     Past Medical History:   Diagnosis Date    Acute sinusitis 1/3/2014    Anemia     Back pain, acute 1/3/2014    Chest pain, non-cardiac 11/12/2013    CKD (chronic kidney disease) stage 3, GFR 30-59 ml/min (HCC)     Closed fracture of fourth thoracic vertebra (HCC)     COPD (chronic obstructive pulmonary disease) ([facility] 3/5/2012    Counseling NOS 5/2/2013    Depression with anxiety     DVT (deep venous thrombosis) ([facility]     Fracture of 5th metatarsal 9/6/2013    History of pulmonary embolism 6/13/2012    Hx of blood clots     Hypercholesteremia     Hypertension     Hypomagnesemia 6/13/2012    Increased MCV 6/13/2012    Insomnia secondary to situational depression 12/12/2012    Left sided abdominal pain 11/13/2013    Lumbar strain 3/27/2013    Muscle spasm     chronic    PE (pulmonary thromboembolism) 10/25/2011    Pulmonary embolism ([address]     Trigger finger 2013     Past Surgical History:   Procedure Laterality Date    ABDOMEN SURGERY      tummy tuck    ABDOMINOPLASTY  2011    ANKLE FRACTURE SURGERY Left     BREAST ENHANCEMENT SURGERY      2011    CARPAL TUNNEL RELEASE      R    COLONOSCOPY N/A 10/9/2019    COLONOSCOPY POLYPECTOMY SNARE/COLD BIOPSY performed by Candace Partida MD at [facility] COLONOSCOPY  10/9/2019    COLONOSCOPY WITH BIOPSY performed by Candace Partida MD at [facility]      Aug     HYSTERECTOMY      total-pre cancerous cells    KNEE SURGERY      R    LARYNGOSCOPY N/A 2020    DIRECT LARYNGOSCOPY AND EXCISION OF THE NODULE ON THE LEFT EPIGLOTTIS AND EXCISION  OF  CYST OF RIGHT EPIGLOTTIS ([address] or [id number] or [id number] or [phone]) performed by Roge Croft MD at [place] [address] History     Socioeconomic History    Marital status:       Spouse name: Not on file    Number of children: Not on file    Years of education: Not on file    Highest education level: Not on file   Occupational History    Not on file   Tobacco Use    Smoking status: Former Smoker     Packs/day: 0.00     Years: 15.00     Pack years: 0.00     Types: Cigarettes     Quit date: 10/9/2012     Years since quittin.3    Smokeless tobacco: Never Used   Vaping Use    Vaping Use: Never used   Substance and Sexual Activity    Alcohol use: No     Alcohol/week: 20.0 standard drinks     Types: 24 Standard drinks or equivalent per week     Comment: [address] 10/10/2012    Drug use: No    Sexual activity: Not on file   Other Topics Concern    Not on file   Social History Narrative    Not on file     Social Determinants of Health     Financial Resource Strain:     Difficulty of Paying Living Expenses: Not on file   Food Insecurity:     Worried About Running Out of Food in the Last Year: Not on file   [facility] 0 FULL   + 0 FULL    Elbow  0  0  FULL   0  0  FULL    Shouldr  0  0  FULL   0  0  FULL    Hip  0  0  FULL   0  0  FULL    Knee  0  0   crepitus/varus  0  0   crepitus/varus   Ankle  0  0  FULL   0  0  FULL    MTP1  0  0  FULL   0  0  FULL    MTP2  0 + FULL   0 + FULL    MTP3  0 + FULL   0 + FULL    MTP4  0 + FULL   0 + FULL    MTP5  0 + FULL   0 + FULL    IP1  0  0  FULL   0  0  FULL    IP2  0  0  FULL   0  0  FULL    IP3  0  0  FULL   0  0  FULL    IP4  0  0  FULL   0  0  FULL    IP5  0  0  FULL   0 0 FULL       Ambulates without assistance, normal gait  Neck: Full ROM, no tenderness,supple   Back- no tenderness. Eyes:  PERRL, extra ocular movements intact, conjunctiva normal   HEENT:  Atraumatic, normocephalic, external ears normal, oropharynx moist, no pharyngeal exudates. Respiratory:  No respiratory distress  GI:  Soft, nondistended, normal bowel sounds, nontender, noorganomegaly, no mass, no rebound, no guarding   :  No costovertebral angle tenderness   Integument:  Well hydrated, no rash or telangiectasias  Lymphatic:  No lymphadenopathy noted   Neurologic:   Alert & oriented x 3, CN 2-12 normal, no focal deficits noted. Sensations Intact. Muscle strength 5/5 proximally and distally in upper and lower extremities.    Psychiatric:  Speech and behavior appropriate           LABS AND IMAGING  Outside data reviewed and in HPI    Lab Results   Component Value Date    WBC 7.5 11/29/2021    RBC 4.37 11/29/2021    HGB 13.2 11/29/2021    HCT 40.9 11/29/2021     11/29/2021    MCV 93.7 11/29/2021    MCH 30.2 11/29/2021    MCHC 32.3 11/29/2021    RDW 16.1 11/29/2021    SEGSPCT 44.7 03/19/2013    LYMPHOPCT 20.4 11/29/2021    MONOPCT 7.2 11/29/2021    EOSPCT 6.1 10/26/2011    BASOPCT 0.8 11/29/2021    MONOSABS 0.5 11/29/2021    LYMPHSABS 1.5 11/29/2021    EOSABS 0.5 11/29/2021    BASOSABS 0.1 11/29/2021    DIFFTYPE Auto-K 03/19/2013       Chemistry        Component Value Date/Time     08/25/2021 1113    K 4.5 08/25/2021 1113    K 4.1 01/20/2019 1401    CL 99 08/25/2021 1113    CO2 26 08/25/2021 1113    BUN 23 (H) 08/25/2021 1113    CREATININE 1.3 (H) 11/29/2021 1356        Component Value Date/Time    CALCIUM 10.4 08/25/2021 1113    ALKPHOS 84 08/25/2021 1113    AST 19 11/29/2021 1356    ALT 16 11/29/2021 1356    BILITOT <0.2 08/25/2021 1113          Lab Results   Component Value Date    SEDRATE 29 08/25/2021     Lab Results   Component Value Date    CRP 3.3 08/25/2021     Lab Results   Component Value Date    RADU Negative 06/21/2012     No results found for: RF, CCPABIGG  Lab Results   Component Value Date    RADU Negative 06/21/2012     No results found for: DSDNAG, DSDNAIGGIFA  No results found for: SSAROAB, SSALAAB  No results found for: SMAB, RNPAB  No results found for: CENTABIGG  No results found for: C3, C4, ACE  Lab Results   Component Value Date    VITD25 62.4 11/19/2018     No results found for: Aman Nilay Results   Component Value Date    UYEGCQGZ90 293 06/14/2012     Lab Results   Component Value Date    TSHFT4 2.37 08/25/2021    TSH 2.84 08/31/2020     Lab Results   Component Value Date    VITD25 62.4 11/19/2018     Data reviewed: April 2021    ESR 30  CRP 5.2  Creatinine 1.4  RADU negative  Bilateral hand and foot x-rays August 2021  1. Large osseous erosion in the left wrist triquetrum new from 01/28/2021. This is compatible with the given history of rheumatoid arthritis. 2. No evidence of inflammatory arthritis in the right hand or bilateral feet. 3. Mild degenerative changes of the bilateral hands and feet as above.           ######################################################################    I thank you for giving me theopportunity to participate in [facility]. If you have any questions or concerns please feel free to contact me. I look forward to following  [facility] along with you.       Electronically signed by: Stephy Escoto MD, MD, 2/8/2022 12:04 PM    Documentation was done using voice recognition dragon software. Every effort was made to ensure accuracy;however, inadvertent unintentional computerized transcription errors may be present.

## 2022-02-23 DIAGNOSIS — M05.79 RHEUMATOID ARTHRITIS INVOLVING MULTIPLE SITES WITH POSITIVE RHEUMATOID FACTOR (HCC): ICD-10-CM

## 2022-02-24 RX ORDER — PREDNISONE 1 MG/1
TABLET ORAL
Qty: 50 TABLET | Refills: 0 | OUTPATIENT
Start: 2022-02-24

## 2022-03-09 DIAGNOSIS — Z79.899 HIGH RISK MEDICATION USE: ICD-10-CM

## 2022-03-09 LAB
ALT SERPL-CCNC: 15 U/L (ref 10–40)
AST SERPL-CCNC: 19 U/L (ref 15–37)
BASOPHILS ABSOLUTE: 0.1 K/UL (ref 0–0.2)
BASOPHILS RELATIVE PERCENT: 0.7 %
CREAT SERPL-MCNC: 1.3 MG/DL (ref 0.6–1.2)
EOSINOPHILS ABSOLUTE: 0.4 K/UL (ref 0–0.6)
EOSINOPHILS RELATIVE PERCENT: 5.2 %
GFR AFRICAN AMERICAN: 48
GFR NON-AFRICAN AMERICAN: 40
HCT VFR BLD CALC: 41.6 % (ref 36–48)
HEMOGLOBIN: 13.8 G/DL (ref 12–16)
LYMPHOCYTES ABSOLUTE: 2.9 K/UL (ref 1–5.1)
LYMPHOCYTES RELATIVE PERCENT: 39.7 %
MCH RBC QN AUTO: 31.1 PG (ref 26–34)
MCHC RBC AUTO-ENTMCNC: 33.2 G/DL (ref 31–36)
MCV RBC AUTO: 93.5 FL (ref 80–100)
MONOCYTES ABSOLUTE: 0.5 K/UL (ref 0–1.3)
MONOCYTES RELATIVE PERCENT: 7.2 %
NEUTROPHILS ABSOLUTE: 3.5 K/UL (ref 1.7–7.7)
NEUTROPHILS RELATIVE PERCENT: 47.2 %
PDW BLD-RTO: 15.6 % (ref 12.4–15.4)
PLATELET # BLD: 213 K/UL (ref 135–450)
PMV BLD AUTO: 9.7 FL (ref 5–10.5)
RBC # BLD: 4.45 M/UL (ref 4–5.2)
WBC # BLD: 7.4 K/UL (ref 4–11)

## 2022-03-21 DIAGNOSIS — M05.79 RHEUMATOID ARTHRITIS INVOLVING MULTIPLE SITES WITH POSITIVE RHEUMATOID FACTOR (HCC): ICD-10-CM

## 2022-03-21 RX ORDER — LEFLUNOMIDE 20 MG/1
TABLET ORAL
Qty: 30 TABLET | Refills: 1 | Status: SHIPPED | OUTPATIENT
Start: 2022-03-21 | End: 2022-04-14

## 2022-04-11 ENCOUNTER — TELEPHONE (OUTPATIENT)
Dept: RHEUMATOLOGY | Age: 77
End: 2022-04-11

## 2022-04-11 DIAGNOSIS — Z79.899 HIGH RISK MEDICATION USE: ICD-10-CM

## 2022-04-11 LAB
ALT SERPL-CCNC: 14 U/L (ref 10–40)
AST SERPL-CCNC: 20 U/L (ref 15–37)
BASOPHILS ABSOLUTE: 0 K/UL (ref 0–0.2)
BASOPHILS RELATIVE PERCENT: 0.8 %
CREAT SERPL-MCNC: 1.3 MG/DL (ref 0.6–1.2)
EOSINOPHILS ABSOLUTE: 0.8 K/UL (ref 0–0.6)
EOSINOPHILS RELATIVE PERCENT: 13.6 %
GFR AFRICAN AMERICAN: 48
GFR NON-AFRICAN AMERICAN: 40
HCT VFR BLD CALC: 37.9 % (ref 36–48)
HEMOGLOBIN: 12.8 G/DL (ref 12–16)
LYMPHOCYTES ABSOLUTE: 1.6 K/UL (ref 1–5.1)
LYMPHOCYTES RELATIVE PERCENT: 27.3 %
MCH RBC QN AUTO: 31.4 PG (ref 26–34)
MCHC RBC AUTO-ENTMCNC: 33.6 G/DL (ref 31–36)
MCV RBC AUTO: 93.5 FL (ref 80–100)
MONOCYTES ABSOLUTE: 0.6 K/UL (ref 0–1.3)
MONOCYTES RELATIVE PERCENT: 9.7 %
NEUTROPHILS ABSOLUTE: 2.8 K/UL (ref 1.7–7.7)
NEUTROPHILS RELATIVE PERCENT: 48.6 %
PDW BLD-RTO: 15.1 % (ref 12.4–15.4)
PLATELET # BLD: 149 K/UL (ref 135–450)
PMV BLD AUTO: 9.8 FL (ref 5–10.5)
RBC # BLD: 4.06 M/UL (ref 4–5.2)
WBC # BLD: 5.8 K/UL (ref 4–11)

## 2022-04-11 RX ORDER — PREDNISONE 1 MG/1
TABLET ORAL
Qty: 60 TABLET | Refills: 0 | Status: SHIPPED | OUTPATIENT
Start: 2022-04-11 | End: 2022-05-11

## 2022-04-11 NOTE — TELEPHONE ENCOUNTER
Pt called stating that she was placed on prednisone taper. She finished the taper a week ago. She is having pain and swelling both hands/ ankles. They started swelling right after stopping the steroids. She is still taking leflunomide 20 mg. Please advise.

## 2022-04-11 NOTE — TELEPHONE ENCOUNTER
Please call the patient and let her know that I will put her back on prednisone taper and she should stay on prednisone 5 mg daily until I see her back. She is scheduled to see me back on May 11.

## 2022-04-14 DIAGNOSIS — M05.79 RHEUMATOID ARTHRITIS INVOLVING MULTIPLE SITES WITH POSITIVE RHEUMATOID FACTOR (HCC): ICD-10-CM

## 2022-04-14 RX ORDER — LEFLUNOMIDE 20 MG/1
TABLET ORAL
Qty: 30 TABLET | Refills: 1 | Status: SHIPPED | OUTPATIENT
Start: 2022-04-14 | End: 2022-05-11 | Stop reason: SDUPTHER

## 2022-04-21 RX ORDER — PREDNISONE 1 MG/1
TABLET ORAL
Qty: 60 TABLET | Refills: 0 | OUTPATIENT
Start: 2022-04-21

## 2022-05-11 ENCOUNTER — OFFICE VISIT (OUTPATIENT)
Dept: RHEUMATOLOGY | Age: 77
End: 2022-05-11
Payer: MEDICARE

## 2022-05-11 VITALS — BODY MASS INDEX: 26.39 KG/M2 | SYSTOLIC BLOOD PRESSURE: 120 MMHG | WEIGHT: 146.6 LBS | DIASTOLIC BLOOD PRESSURE: 80 MMHG

## 2022-05-11 DIAGNOSIS — Z79.899 HIGH RISK MEDICATION USE: ICD-10-CM

## 2022-05-11 DIAGNOSIS — M15.9 PRIMARY OSTEOARTHRITIS INVOLVING MULTIPLE JOINTS: ICD-10-CM

## 2022-05-11 DIAGNOSIS — M05.79 RHEUMATOID ARTHRITIS INVOLVING MULTIPLE SITES WITH POSITIVE RHEUMATOID FACTOR (HCC): Primary | ICD-10-CM

## 2022-05-11 LAB
ALT SERPL-CCNC: 12 U/L (ref 10–40)
AST SERPL-CCNC: 17 U/L (ref 15–37)
BASOPHILS ABSOLUTE: 0 K/UL (ref 0–0.2)
BASOPHILS RELATIVE PERCENT: 0.6 %
CREAT SERPL-MCNC: 1.5 MG/DL (ref 0.6–1.2)
EOSINOPHILS ABSOLUTE: 0.7 K/UL (ref 0–0.6)
EOSINOPHILS RELATIVE PERCENT: 11.4 %
GFR AFRICAN AMERICAN: 41
GFR NON-AFRICAN AMERICAN: 34
HCT VFR BLD CALC: 39 % (ref 36–48)
HEMOGLOBIN: 12.8 G/DL (ref 12–16)
LYMPHOCYTES ABSOLUTE: 1.4 K/UL (ref 1–5.1)
LYMPHOCYTES RELATIVE PERCENT: 21.3 %
MCH RBC QN AUTO: 30.6 PG (ref 26–34)
MCHC RBC AUTO-ENTMCNC: 32.8 G/DL (ref 31–36)
MCV RBC AUTO: 93.3 FL (ref 80–100)
MONOCYTES ABSOLUTE: 0.5 K/UL (ref 0–1.3)
MONOCYTES RELATIVE PERCENT: 8 %
NEUTROPHILS ABSOLUTE: 3.7 K/UL (ref 1.7–7.7)
NEUTROPHILS RELATIVE PERCENT: 58.7 %
PDW BLD-RTO: 14.8 % (ref 12.4–15.4)
PLATELET # BLD: 184 K/UL (ref 135–450)
PMV BLD AUTO: 9.6 FL (ref 5–10.5)
RBC # BLD: 4.18 M/UL (ref 4–5.2)
WBC # BLD: 6.4 K/UL (ref 4–11)

## 2022-05-11 PROCEDURE — 99214 OFFICE O/P EST MOD 30 MIN: CPT | Performed by: INTERNAL MEDICINE

## 2022-05-11 RX ORDER — PREDNISONE 1 MG/1
TABLET ORAL
Qty: 60 TABLET | Refills: 0 | Status: CANCELLED | OUTPATIENT
Start: 2022-05-11

## 2022-05-11 RX ORDER — LEFLUNOMIDE 20 MG/1
TABLET ORAL
Qty: 30 TABLET | Refills: 2 | Status: SHIPPED | OUTPATIENT
Start: 2022-05-11 | End: 2022-07-29

## 2022-05-11 NOTE — PROGRESS NOTES
2022  Patient Name: Daniel Bower  : 1945  Medical Record: [id number]      ASSESSMENT AND PLAN    Assessment/Plan:      ASSESSMENT:    1. Rheumatoid arthritis involving multiple sites with positive rheumatoid factor ([facility]    2. High risk medication use    3. Primary osteoarthritis involving multiple joints        PLAN:     Tita Escobedo was seen today for follow-up. Diagnoses and all orders for this visit:    Rheumatoid arthritis involving multiple sites with positive rheumatoid factor (HCC)  -     leflunomide (ARAVA) 20 MG tablet; TAKE 1 TABLET BY MOUTH EVERY DAY    High risk medication use    Primary osteoarthritis involving multiple joints       , anti-CCP negative, ESR 30, CRP 5.2, active synovitis on joint exam  Left wrist x-ray with erosions in the triquetrum  No active synovitis on joint exam.  CONTINUE leflunomide 20 mg daily  Discontinue prednisone  Labs every 4 weeks  Recommend pneumonia and shingles vaccine. She is up-to-date with Covid vaccine    Leflunomide can cause nausea/vomiting, diarrhea, elevated liver enzymes, bone marrow toxicity, rare cases of interstitial lung diseases, birth defects and were explained in detail to the patient. We have to check blood work every month for first 3 months and then every 2-3 months    The patient indicates understanding of these issues and agrees with the plan. Return in about 3 months (around 2022). The risks and benefits of my recommendations, as well as other treatment options, benefits and side effects werediscussed with the patient. All questions were answered.        MEDICATIONS  Current Outpatient Medications   Medication Sig Dispense Refill    leflunomide (ARAVA) 20 MG tablet TAKE 1 TABLET BY MOUTH EVERY DAY 30 tablet 2    amitriptyline (ELAVIL) 50 MG tablet       rOPINIRole HCl (REQUIP PO) Take by mouth      calcium carbonate 600 MG TABS tablet Take 1 tablet by mouth daily 90 tablet 3    tiZANidine (ZANAFLEX) 4 MG tablet Take 4 mg by mouth every 6 hours as needed      aspirin 81 MG tablet Take 81 mg by mouth daily      atorvastatin (LIPITOR) 20 MG tablet Take 20 mg by mouth daily      cetirizine (ZYRTEC) 10 MG tablet Take 10 mg by mouth daily      linaclotide (LINZESS) 145 MCG capsule Take 145 mcg by mouth every morning (before breakfast)      amLODIPine (NORVASC) 5 MG tablet Take 5 mg by mouth daily       Multiple Vitamins-Minerals (CENTRUM SILVER PO) Take by mouth daily       zolpidem (AMBIEN) 10 MG tablet TAKE ONE TABLET BY MOUTH EVERY NIGHT AT BEDTIME AS NEEDED FOR SLEEP 30 tablet 0    HYDROcodone-acetaminophen (NORCO)  MG per tablet One table every 6 hours as needed for pain. 100 tablet 0    Omega-3 350 MG CAPS Take 1 capsule by mouth daily. 30 capsule 3    Cholecalciferol (VITAMIN D3) 1000 UNITS TABS TAKE ONE TABLET BY MOUTH EVERY DAY 30 tablet 2     No current facility-administered medications for this visit. ALLERGIES  Allergies   Allergen Reactions    Morphine      40 yrs ago threw up after surgery don't know if it was morphine or the anesthesia         Comments  No specialty comments available. Background history:  Seth Blanco is a 68 y.o. female with past medical history of pulmonary embolism, anxiety, depression, chronic kidney disease, chronic pain, hypertension who is being seen for follow up evaluation of  joint pain. Symptoms started 1 year ago. It started in the left hand and spread to involve the right hand. She has been on daily basis without any significant living or aggravating factors. She describes her pain as aching 9 out of 10. She has swelling in the knuckles and wrists. She has morning stiffness for 2 hours but she has stiffness in the hands that can last all day long. She has difficulty opening doorknobs and jars. She is on Norco for chronic pain. She is unable to take oral NSAIDs due to chronic kidney disease.   She has also tried topical analgesics with minimal benefit. She denies family history of rheumatoid arthritis. She denies psoriasis, inflammatory bowel disease, inflammatory back pain, dactylitis, enthesitis, tenosynovitis or uveitis. She denies fever, weight loss or swollen glands. She had a work-up with PCP that showed , ESR 30 and CRP 5.2. Interim history: She presents for follow-up of rheumatoid arthritis, osteoarthritis. She is on leflunomide 20 mg daily and prednisone 2.5 mg daily. She is tapering down her prednisone. She has few more days of prednisone left. She reports significant improvement in joint pain, swelling and stiffness on prednisone and leflunomide. She denies any side effects with prednisone and leflunomide. She denies any other self blood work showed positive RF [166], negative anti-CCP antibody with normal ESR and CRP. Hand and foot x-rays show changes consistent with degenerative arthritis and erosions in the left wrist triquetrum. She denies any recent fevers or infections. HPI  Review of Systems    REVIEW OF SYSTEMS: Positive for DVT, fatigue  Constitutional: No unanticipated weight loss or fevers. Integumentary: No rash, photosensitivity, malar rash, livedo reticularis, alopecia and Raynaud's symptoms, sclerodactyly, skin tightening  Eyes: negative for visual disturbance and persistent redness, discharge from eyes   ENT: - No tinnitus, loss of hearing, vertigo, or recurrent ear infections.  - No history of nasal/oral ulcers. - No history of dry eyes/dry mouth  Cardiovascular: No history of pericarditis, chest pain or murmur or palpitations  Respiratory: No shortness of breath, cough or history of interstitial lung disease. No history of pleurisy. No history of tuberculosis or atypical infections. Gastrointestinal: No history of heart burn, dysphagia or esophageal dysmotility. No change in bowel habits or any inflammatory bowel disease.   Genitourinary: No history of renal disease, miscarriages. Hematologic/Lymphatic: No abnormal bruising or bleeding,  swollen lymph nodes. Neurological: No history of headaches, seizure or focal weakness. No history of neuropathies, paresthesias or hyperesthesias, facial droop, diplopia  Psychiatric: No history of bipolar disease  Endocrine: Denies any polyuria, polydipsia and osteoporosis  Allergic/Immunologic: No nasal congestion or hives. I have reviewed patients Past medical History, Social History and Family History as mentioned in her chart and this remains unchanged fromprevious.     Past Medical History:   Diagnosis Date    Acute sinusitis 1/3/2014    Anemia     Back pain, acute 1/3/2014    Chest pain, non-cardiac 11/12/2013    CKD (chronic kidney disease) stage 3, GFR 30-59 ml/min ([facility])     Closed fracture of fourth thoracic vertebra ([facility])     COPD (chronic obstructive pulmonary disease) ([address] 3/5/2012    Counseling NOS 5/2/2013    Depression with anxiety     DVT (deep venous thrombosis) ([address]     Fracture of 5th metatarsal 9/6/2013    History of pulmonary embolism 6/13/2012    Hx of blood clots     Hypercholesteremia     Hypertension     Hypomagnesemia 6/13/2012    Increased MCV 6/13/2012    Insomnia secondary to situational depression 12/12/2012    Left sided abdominal pain 11/13/2013    Lumbar strain 3/27/2013    Muscle spasm     chronic    PE (pulmonary thromboembolism) 10/25/2011    Pulmonary embolism ([address]     Trigger finger 11/12/2013     Past Surgical History:   Procedure Laterality Date    ABDOMEN SURGERY      tummy tuck    ABDOMINOPLASTY  9/1/2011    ANKLE FRACTURE SURGERY Left 10-9/2015    BREAST ENHANCEMENT SURGERY      9/1/2011    CARPAL TUNNEL RELEASE      R    COLONOSCOPY N/A 10/9/2019    COLONOSCOPY POLYPECTOMY SNARE/COLD BIOPSY performed by Soraya Neville MD at [address] [facility]  10/9/2019    COLONOSCOPY WITH BIOPSY performed by Soraya Neville MD at [facility]      Aug     HYSTERECTOMY      total-pre cancerous cells    KNEE SURGERY      R    LARYNGOSCOPY N/A 2020    DIRECT LARYNGOSCOPY AND EXCISION OF THE NODULE ON THE LEFT EPIGLOTTIS AND EXCISION  OF  CYST OF RIGHT EPIGLOTTIS ([id number] or [phone] or [id number] or [phone]) performed by Irene Menchaca MD at [place] [address] History     Socioeconomic History    Marital status:      Spouse name: Not on file    Number of children: Not on file    Years of education: Not on file    Highest education level: Not on file   Occupational History    Not on file   Tobacco Use    Smoking status: Former Smoker     Packs/day: 0.00     Years: 15.00     Pack years: 0.00     Types: Cigarettes     Quit date: 10/9/2012     Years since quittin.5    Smokeless tobacco: Never Used   Vaping Use    Vaping Use: Never used   Substance and Sexual Activity    Alcohol use: No     Alcohol/week: 20.0 standard drinks     Types: 24 Standard drinks or equivalent per week     Comment: [id number] 10/10/2012    Drug use: No    Sexual activity: Not on file   Other Topics Concern    Not on file   Social History Narrative    Not on file     Social Determinants of Health     Financial Resource Strain:     Difficulty of Paying Living Expenses: Not on file   Food Insecurity:     Worried About Running Out of Food in the Last Year: Not on file    Macario of Food in the Last Year: Not on file   Transportation Needs:     Lack of Transportation (Medical): Not on file    Lack of Transportation (Non-Medical):  Not on file   Physical Activity:     Days of Exercise per Week: Not on file    Minutes of Exercise per Session: Not on file   Stress:     Feeling of Stress : Not on file   Social Connections:     Frequency of Communication with Friends and Family: Not on file    Frequency of Social Gatherings with Friends and Family: Not on file    Attends Zoroastrianism Services: Not on file   Tesie Caridad Active Member of Clubs or Organizations: Not on file    Attends Club or Organization Meetings: Not on file    Marital Status: Not on file   Intimate Partner Violence:     Fear of Current or Ex-Partner: Not on file    Emotionally Abused: Not on file    Physically Abused: Not on file    Sexually Abused: Not on file   Housing Stability:     Unable to Pay for Housing in the Last Year: Not on file    Number of Places Lived in the Last Year: Not on file    Unstable Housing in the Last Year: Not on file     Family History   Problem Relation Age of Onset    Cancer Mother         pancrease     Stroke Father     Stroke Brother     Heart Disease Neg Hx          PHYSICAL EXAM   Vitals:    05/11/22 1130   BP: 120/80   Site: Right Upper Arm   Position: Sitting   Cuff Size: Medium Adult   Weight: 146 lb 9.6 oz (66.5 kg)     Physical Exam  Constitutional:  Well developed, well nourished, no acute distress, non-toxic appearance   Musculoskeletal:    RIGHT  Swell  Tender  ROM  LEFT  Swell  Tender  ROM    DIP2  0  0  FULL   0  0  FULL    DIP3  0  0  FULL   0  0  FULL    DIP4  0  0  FULL   0  0  FULL    DIP5  0  0  FULL   0  0  FULL    PIP1  0  0  FULL   0  0  FULL    PIP2  0 0 FULL   0  0  FULL    PIP3  0 0 FULL   0  0  FULL    PIP4  0 0 FULL   0  0  FULL    PIP5  0 0 FULL   0  0  FULL    MCP1  0 0 FULL   0 0 FULL    MCP2  0 0 FULL   0 0 FULL    MCP3  0 0 FULL   0  0  FULL    MCP4  0  0  FULL   0  0  FULL    MCP5  0  0  FULL   0  0  FULL    Wrist  0 0 FULL   + 0 FULL    Elbow  0  0  FULL   0  0  FULL    Shouldr  0  0  FULL   0  0  FULL    Hip  0  0  FULL   0  0  FULL    Knee  0  0   crepitus/varus  0  0   crepitus/varus   Ankle  0  0  FULL   0  0  FULL    MTP1  0  0  FULL   0  0  FULL    MTP2  0 + FULL   0 + FULL    MTP3  0 + FULL   0 + FULL    MTP4  0 + FULL   0 + FULL    MTP5  0 + FULL   0 + FULL    IP1  0  0  FULL   0  0  FULL    IP2  0  0  FULL   0  0  FULL    IP3  0  0  FULL   0  0  FULL    IP4  0  0  FULL   0  0  FULL IP5  0  0  FULL   0 0 FULL       Ambulates without assistance, normal gait  Neck: Full ROM, no tenderness,supple   Back- no tenderness. Eyes:  PERRL, extra ocular movements intact, conjunctiva normal   HEENT:  Atraumatic, normocephalic, external ears normal, oropharynx moist, no pharyngeal exudates. Respiratory:  No respiratory distress  GI:  Soft, nondistended, normal bowel sounds, nontender, noorganomegaly, no mass, no rebound, no guarding   :  No costovertebral angle tenderness   Integument:  Well hydrated, no rash or telangiectasias  Lymphatic:  No lymphadenopathy noted   Neurologic:   Alert & oriented x 3, CN 2-12 normal, no focal deficits noted. Sensations Intact. Muscle strength 5/5 proximally and distally in upper and lower extremities.    Psychiatric:  Speech and behavior appropriate           LABS AND IMAGING  Outside data reviewed and in HPI    Lab Results   Component Value Date    WBC 5.8 04/11/2022    RBC 4.06 04/11/2022    HGB 12.8 04/11/2022    HCT 37.9 04/11/2022     04/11/2022    MCV 93.5 04/11/2022    MCH 31.4 04/11/2022    MCHC 33.6 04/11/2022    RDW 15.1 04/11/2022    SEGSPCT 44.7 03/19/2013    LYMPHOPCT 27.3 04/11/2022    MONOPCT 9.7 04/11/2022    EOSPCT 6.1 10/26/2011    BASOPCT 0.8 04/11/2022    MONOSABS 0.6 04/11/2022    LYMPHSABS 1.6 04/11/2022    EOSABS 0.8 04/11/2022    BASOSABS 0.0 04/11/2022    DIFFTYPE Auto-K 03/19/2013       Chemistry        Component Value Date/Time     08/25/2021 1113    K 4.5 08/25/2021 1113    K 4.1 01/20/2019 1401    CL 99 08/25/2021 1113    CO2 26 08/25/2021 1113    BUN 23 (H) 08/25/2021 1113    CREATININE 1.3 (H) 04/11/2022 0807        Component Value Date/Time    CALCIUM 10.4 08/25/2021 1113    ALKPHOS 84 08/25/2021 1113    AST 20 04/11/2022 0807    ALT 14 04/11/2022 0807    BILITOT <0.2 08/25/2021 1113          Lab Results   Component Value Date    SEDRATE 29 08/25/2021     Lab Results   Component Value Date    CRP 3.3 08/25/2021     Lab Results   Component Value Date    RAUD Negative 06/21/2012     No results found for: RF, CCPABIGG  Lab Results   Component Value Date    RADU Negative 06/21/2012     No results found for: DSDNAG, DSDNAIGGIFA  No results found for: Zee Alva  No results found for: SMAB, RNPAB  No results found for: CENTABIGG  No results found for: C3, C4, ACE  Lab Results   Component Value Date    VITD25 62.4 11/19/2018     No results found for: Chu Branchle Results   Component Value Date    UHYZICSY42 293 06/14/2012     Lab Results   Component Value Date    TSHFT4 2.37 08/25/2021    TSH 2.84 08/31/2020     Lab Results   Component Value Date    VITD25 62.4 11/19/2018     Data reviewed: April 2021    ESR 30  CRP 5.2  Creatinine 1.4  RADU negative  Bilateral hand and foot x-rays August 2021  1. Large osseous erosion in the left wrist triquetrum new from 01/28/2021. This is compatible with the given history of rheumatoid arthritis. 2. No evidence of inflammatory arthritis in the right hand or bilateral feet. 3. Mild degenerative changes of the bilateral hands and feet as above.           ######################################################################    I thank you for giving me theopportunity to participate in [facility]. If you have any questions or concerns please feel free to contact me. I look forward to following  Chayo Jones along with you. Electronically signed by: Raymundo Briceño MD, MD, 5/11/2022 12:06 PM    Documentation was done using voice recognition dragon software. Every effort was made to ensure accuracy;however, inadvertent unintentional computerized transcription errors may be present.

## 2022-06-02 ENCOUNTER — TELEPHONE (OUTPATIENT)
Dept: RHEUMATOLOGY | Age: 77
End: 2022-06-02

## 2022-06-02 RX ORDER — PREDNISONE 1 MG/1
TABLET ORAL
Qty: 50 TABLET | Refills: 0 | Status: SHIPPED | OUTPATIENT
Start: 2022-06-02 | End: 2022-07-11 | Stop reason: SDUPTHER

## 2022-06-02 NOTE — TELEPHONE ENCOUNTER
Please call the patient and let her know that I will place her on a prednisone taper and then she should make a sooner follow-up appointment to discuss other treatment options.

## 2022-06-02 NOTE — TELEPHONE ENCOUNTER
Patient is aware, she states that the prednisone works well. Does she really need to make an appt sooner. She will call us if needs to come in.

## 2022-06-02 NOTE — TELEPHONE ENCOUNTER
Patient called in stating that both ankles are swollen, purple in color, toes are hurting x 5 days. Also complains of wrist/fingers hurting/swollen and painful more the left than right. Would like a call back.

## 2022-06-23 DIAGNOSIS — Z79.899 HIGH RISK MEDICATION USE: ICD-10-CM

## 2022-06-23 LAB
ALT SERPL-CCNC: 13 U/L (ref 10–40)
AST SERPL-CCNC: 17 U/L (ref 15–37)
BASOPHILS ABSOLUTE: 0 K/UL (ref 0–0.2)
BASOPHILS RELATIVE PERCENT: 0.4 %
CREAT SERPL-MCNC: 1.3 MG/DL (ref 0.6–1.2)
EOSINOPHILS ABSOLUTE: 0.6 K/UL (ref 0–0.6)
EOSINOPHILS RELATIVE PERCENT: 5.5 %
GFR AFRICAN AMERICAN: 48
GFR NON-AFRICAN AMERICAN: 40
HCT VFR BLD CALC: 41.4 % (ref 36–48)
HEMOGLOBIN: 13.6 G/DL (ref 12–16)
LYMPHOCYTES ABSOLUTE: 2.2 K/UL (ref 1–5.1)
LYMPHOCYTES RELATIVE PERCENT: 22.2 %
MCH RBC QN AUTO: 31.3 PG (ref 26–34)
MCHC RBC AUTO-ENTMCNC: 32.8 G/DL (ref 31–36)
MCV RBC AUTO: 95.2 FL (ref 80–100)
MONOCYTES ABSOLUTE: 0.8 K/UL (ref 0–1.3)
MONOCYTES RELATIVE PERCENT: 8.3 %
NEUTROPHILS ABSOLUTE: 6.4 K/UL (ref 1.7–7.7)
NEUTROPHILS RELATIVE PERCENT: 63.6 %
PDW BLD-RTO: 14.9 % (ref 12.4–15.4)
PLATELET # BLD: 221 K/UL (ref 135–450)
PMV BLD AUTO: 9.5 FL (ref 5–10.5)
RBC # BLD: 4.35 M/UL (ref 4–5.2)
WBC # BLD: 10.1 K/UL (ref 4–11)

## 2022-07-11 ENCOUNTER — OFFICE VISIT (OUTPATIENT)
Dept: RHEUMATOLOGY | Age: 77
End: 2022-07-11
Payer: MEDICARE

## 2022-07-11 VITALS
BODY MASS INDEX: 26.05 KG/M2 | HEIGHT: 63 IN | DIASTOLIC BLOOD PRESSURE: 80 MMHG | SYSTOLIC BLOOD PRESSURE: 118 MMHG | WEIGHT: 147 LBS

## 2022-07-11 DIAGNOSIS — Z79.899 HIGH RISK MEDICATION USE: ICD-10-CM

## 2022-07-11 DIAGNOSIS — M05.79 RHEUMATOID ARTHRITIS INVOLVING MULTIPLE SITES WITH POSITIVE RHEUMATOID FACTOR (HCC): ICD-10-CM

## 2022-07-11 DIAGNOSIS — M15.9 PRIMARY OSTEOARTHRITIS INVOLVING MULTIPLE JOINTS: ICD-10-CM

## 2022-07-11 DIAGNOSIS — M05.79 RHEUMATOID ARTHRITIS INVOLVING MULTIPLE SITES WITH POSITIVE RHEUMATOID FACTOR (HCC): Primary | ICD-10-CM

## 2022-07-11 LAB
ALT SERPL-CCNC: 12 U/L (ref 10–40)
AST SERPL-CCNC: 17 U/L (ref 15–37)
BASOPHILS ABSOLUTE: 0 K/UL (ref 0–0.2)
BASOPHILS RELATIVE PERCENT: 0.6 %
CREAT SERPL-MCNC: 1.4 MG/DL (ref 0.6–1.2)
EOSINOPHILS ABSOLUTE: 0.1 K/UL (ref 0–0.6)
EOSINOPHILS RELATIVE PERCENT: 0.9 %
GFR AFRICAN AMERICAN: 44
GFR NON-AFRICAN AMERICAN: 36
HCT VFR BLD CALC: 39.9 % (ref 36–48)
HEMOGLOBIN: 13.2 G/DL (ref 12–16)
LYMPHOCYTES ABSOLUTE: 0.7 K/UL (ref 1–5.1)
LYMPHOCYTES RELATIVE PERCENT: 9.4 %
MCH RBC QN AUTO: 31.1 PG (ref 26–34)
MCHC RBC AUTO-ENTMCNC: 33.2 G/DL (ref 31–36)
MCV RBC AUTO: 93.6 FL (ref 80–100)
MONOCYTES ABSOLUTE: 0.3 K/UL (ref 0–1.3)
MONOCYTES RELATIVE PERCENT: 5 %
NEUTROPHILS ABSOLUTE: 5.8 K/UL (ref 1.7–7.7)
NEUTROPHILS RELATIVE PERCENT: 84.1 %
PDW BLD-RTO: 14.8 % (ref 12.4–15.4)
PLATELET # BLD: 158 K/UL (ref 135–450)
PMV BLD AUTO: 10.1 FL (ref 5–10.5)
RBC # BLD: 4.26 M/UL (ref 4–5.2)
WBC # BLD: 6.9 K/UL (ref 4–11)

## 2022-07-11 PROCEDURE — 1123F ACP DISCUSS/DSCN MKR DOCD: CPT | Performed by: INTERNAL MEDICINE

## 2022-07-11 PROCEDURE — 99214 OFFICE O/P EST MOD 30 MIN: CPT | Performed by: INTERNAL MEDICINE

## 2022-07-11 RX ORDER — PREDNISONE 1 MG/1
TABLET ORAL
Qty: 60 TABLET | Refills: 1 | Status: SHIPPED | OUTPATIENT
Start: 2022-07-11 | End: 2022-09-07

## 2022-07-11 RX ORDER — ETANERCEPT 50 MG/ML
50 SOLUTION SUBCUTANEOUS WEEKLY
Qty: 4 EACH | Refills: 5 | Status: SHIPPED | OUTPATIENT
Start: 2022-07-11

## 2022-07-11 RX ORDER — PREDNISONE 1 MG/1
TABLET ORAL
Qty: 50 TABLET | Refills: 0 | Status: CANCELLED | OUTPATIENT
Start: 2022-07-11

## 2022-07-11 RX ORDER — ROPINIROLE 4 MG/1
TABLET, FILM COATED ORAL
COMMUNITY
Start: 2022-05-21

## 2022-07-11 NOTE — PROGRESS NOTES
2022  Patient Name: Sudha Malloy  : 1945  Medical Record: [id number]      ASSESSMENT AND PLAN    Assessment/Plan:      ASSESSMENT:    1. Rheumatoid arthritis involving multiple sites with positive rheumatoid factor ([facility] [address]    2. Primary osteoarthritis involving multiple joints    3. High risk medication use        PLAN:     Marco Antonio Mueller was seen today for follow-up. Diagnoses and all orders for this visit:    Rheumatoid arthritis involving multiple sites with positive rheumatoid factor (HCC)  -     Etanercept (ENBREL MINI) 50 MG/ML SOCT; Inject 50 mg into the skin once a week  -     Quantiferon, Incubated; Future  -     predniSONE (DELTASONE) 5 MG tablet; Take 2 tab by mouth daily    Primary osteoarthritis involving multiple joints    High risk medication use  -     ALT; Standing  -     AST; Standing  -     CBC with Auto Differential; Standing  -     Creatinine; Standing       , anti-CCP negative, ESR 30, CRP 5.2  Left wrist x-ray with erosions in the triquetrum  Active synovitis on joint exam  I will start Enbrel 50 mg subcu once a week  CONTINUE leflunomide 20 mg daily  Restart prednisone 10 mg daily  Labs every 4 weeks  Recommend pneumonia. She is up-to-date with pneumonia Covid vaccine    TNF INHIBITORS can cause increased risk of TB reactivation, oppurtunistic infections, lupus like illness, rash, hypersensitivity reaction, infusion reactions, demyelinating disease and possible risk of malignancies and lung diseases and were explained to the patient    The patient indicates understanding of these issues and agrees with the plan. Return in about 8 weeks (around 2022). The risks and benefits of my recommendations, as well as other treatment options, benefits and side effects werediscussed with the patient. All questions were answered.        MEDICATIONS  Current Outpatient Medications   Medication Sig Dispense Refill    rOPINIRole (REQUIP) 4 MG tablet TAKE 1 TABLET BY MOUTH NIGHTLY AT BEDTIME      Etanercept (ENBREL MINI) 50 MG/ML SOCT Inject 50 mg into the skin once a week 4 each 5    predniSONE (DELTASONE) 5 MG tablet Take 2 tab by mouth daily 60 tablet 1    leflunomide (ARAVA) 20 MG tablet TAKE 1 TABLET BY MOUTH EVERY DAY 30 tablet 2    amitriptyline (ELAVIL) 50 MG tablet       calcium carbonate 600 MG TABS tablet Take 1 tablet by mouth daily 90 tablet 3    tiZANidine (ZANAFLEX) 4 MG tablet Take 4 mg by mouth every 6 hours as needed      aspirin 81 MG tablet Take 81 mg by mouth daily      atorvastatin (LIPITOR) 20 MG tablet Take 20 mg by mouth daily      cetirizine (ZYRTEC) 10 MG tablet Take 10 mg by mouth daily      linaclotide (LINZESS) 145 MCG capsule Take 145 mcg by mouth every morning (before breakfast)      amLODIPine (NORVASC) 5 MG tablet Take 5 mg by mouth daily       Multiple Vitamins-Minerals (CENTRUM SILVER PO) Take by mouth daily       zolpidem (AMBIEN) 10 MG tablet TAKE ONE TABLET BY MOUTH EVERY NIGHT AT BEDTIME AS NEEDED FOR SLEEP 30 tablet 0    HYDROcodone-acetaminophen (NORCO)  MG per tablet One table every 6 hours as needed for pain. 100 tablet 0    Omega-3 350 MG CAPS Take 1 capsule by mouth daily. 30 capsule 3    Cholecalciferol (VITAMIN D3) 1000 UNITS TABS TAKE ONE TABLET BY MOUTH EVERY DAY 30 tablet 2     No current facility-administered medications for this visit. ALLERGIES  Allergies   Allergen Reactions    Morphine      40 yrs ago threw up after surgery don't know if it was morphine or the anesthesia         Comments  No specialty comments available. Background history:  Idris Miranda is a 68 y.o. female with past medical history of pulmonary embolism, anxiety, depression, chronic kidney disease, chronic pain, hypertension who is being seen for follow up evaluation of  joint pain. Symptoms started 1 year ago. It started in the left hand and spread to involve the right hand.   She has been on daily basis without any significant living or aggravating factors. She describes her pain as aching 9 out of 10. She has swelling in the knuckles and wrists. She has morning stiffness for 2 hours but she has stiffness in the hands that can last all day long. She has difficulty opening doorknobs and jars. She is on Norco for chronic pain. She is unable to take oral NSAIDs due to chronic kidney disease. She has also tried topical analgesics with minimal benefit. She denies family history of rheumatoid arthritis. She denies psoriasis, inflammatory bowel disease, inflammatory back pain, dactylitis, enthesitis, tenosynovitis or uveitis. She denies fever, weight loss or swollen glands. She had a work-up with PCP that showed , ESR 30 and CRP 5.2. Interim history: She presents for follow-up of rheumatoid arthritis, osteoarthritis. She is on leflunomide 20 mg daily. She is off of prednisone. She reports worsening joint symptoms of her prednisone. She has been, swelling and stiffness in the joints. She has difficulty opening doorknobs and jars. She denies any side effects with prednisone and leflunomide. blood work showed positive RF [166], negative anti-CCP antibody with normal ESR and CRP. Hand and foot x-rays show changes consistent with degenerative arthritis and erosions in the left wrist triquetrum. She denies any recent fevers or infections. HPI  Review of Systems    REVIEW OF SYSTEMS: Positive for DVT, fatigue  Constitutional: No unanticipated weight loss or fevers. Integumentary: No rash, photosensitivity, malar rash, livedo reticularis, alopecia and Raynaud's symptoms, sclerodactyly, skin tightening  Eyes: negative for visual disturbance and persistent redness, discharge from eyes   ENT: - No tinnitus, loss of hearing, vertigo, or recurrent ear infections.  - No history of nasal/oral ulcers.   - No history of dry eyes/dry mouth  Cardiovascular: No history of pericarditis, chest pain or murmur or ANKLE FRACTURE SURGERY Left     BREAST ENHANCEMENT SURGERY      2011    CARPAL TUNNEL RELEASE      R    COLONOSCOPY N/A 10/9/2019    COLONOSCOPY POLYPECTOMY SNARE/COLD BIOPSY performed by Mikaela Seth MD at [facility] COLONOSCOPY  10/9/2019    COLONOSCOPY WITH BIOPSY performed by Mikaela Seth MD at [facility]      Aug     HYSTERECTOMY (CERVIX STATUS UNKNOWN)      total-pre cancerous cells    KNEE SURGERY      R    LARYNGOSCOPY N/A 2020    DIRECT LARYNGOSCOPY AND EXCISION OF THE NODULE ON THE LEFT EPIGLOTTIS AND EXCISION  OF  CYST OF RIGHT EPIGLOTTIS (70174 or [id number] or [id number] or [phone]) performed by Shannon Patel MD at [place] [address] History     Socioeconomic History    Marital status:      Spouse name: Not on file    Number of children: Not on file    Years of education: Not on file    Highest education level: Not on file   Occupational History    Not on file   Tobacco Use    Smoking status: Former Smoker     Packs/day: 0.00     Years: 15.00     Pack years: 0.00     Types: Cigarettes     Quit date: 10/9/2012     Years since quittin.7    Smokeless tobacco: Never Used   Vaping Use    Vaping Use: Never used   Substance and Sexual Activity    Alcohol use: No     Alcohol/week: 20.0 standard drinks     Types: 24 Standard drinks or equivalent per week     Comment: [id number] 10/10/2012    Drug use: No    Sexual activity: Not on file   Other Topics Concern    Not on file   Social History Narrative    Not on file     Social Determinants of Health     Financial Resource Strain:     Difficulty of Paying Living Expenses: Not on file   Food Insecurity:     Worried About Running Out of Food in the Last Year: Not on file    Macario of Food in the Last Year: Not on file   Transportation Needs:     Lack of Transportation (Medical): Not on file    Lack of Transportation (Non-Medical):  Not on file   Physical Activity:     Days of Exercise per Week: Not on file    Minutes of Exercise per Session: Not on file   Stress:     Feeling of Stress : Not on file   Social Connections:     Frequency of Communication with Friends and Family: Not on file    Frequency of Social Gatherings with Friends and Family: Not on file    Attends Restorationist Services: Not on file    Active Member of Clubs or Organizations: Not on file    Attends Club or Organization Meetings: Not on file    Marital Status: Not on file   Intimate Partner Violence:     Fear of Current or Ex-Partner: Not on file    Emotionally Abused: Not on file    Physically Abused: Not on file    Sexually Abused: Not on file   Housing Stability:     Unable to Pay for Housing in the Last Year: Not on file    Number of Places Lived in the Last Year: Not on file    Unstable Housing in the Last Year: Not on file     Family History   Problem Relation Age of Onset    Cancer Mother         pancrease     Stroke Father     Stroke Brother     Heart Disease Neg Hx          PHYSICAL EXAM   Vitals:    07/11/22 1259   BP: 118/80   Site: Right Upper Arm   Position: Sitting   Cuff Size: Medium Adult   Weight: 147 lb (66.7 kg)   Height: 5' 2.5\" (1.588 m)     Physical Exam  Constitutional:  Well developed, well nourished, no acute distress, non-toxic appearance   Musculoskeletal:    RIGHT  Swell  Tender  ROM  LEFT  Swell  Tender  ROM    DIP2  0  0  FULL   0  0  FULL    DIP3  0  0  FULL   0  0  FULL    DIP4  0  0  FULL   0  0  FULL    DIP5  0  0  FULL   0  0  FULL    PIP1  0  0  FULL   0  0  FULL    PIP2  + + FULL   + + FULL    PIP3  + + FULL   + +  FULL    PIP4  0 0 FULL   0  0  FULL    PIP5  0 0 FULL   0  0  FULL    MCP1  0 0 FULL   0 0 FULL    MCP2  + + FULL   ++ ++ FULL    MCP3  + + FULL   ++ ++ FULL    MCP4  0  0  FULL   0  0  FULL    MCP5  0  0  FULL   0  0  FULL    Wrist  0 0 FULL   + 0 FULL    Elbow  0  0  FULL   0  0  FULL    Shouldr  0  0  FULL   0  0 FULL    Hip  0  0  FULL   0  0  FULL    Knee  0  0   crepitus/varus  0  0   crepitus/varus   Ankle  0  0  FULL   + + FULL    MTP1  0  0  FULL   0  0  FULL    MTP2  + + FULL   + + FULL    MTP3  + + FULL   + + FULL    MTP4  + + FULL   + + FULL    MTP5  + + FULL   + + FULL    IP1  0  0  FULL   0  0  FULL    IP2  0  0  FULL   0  0  FULL    IP3  0  0  FULL   0  0  FULL    IP4  0  0  FULL   0  0  FULL    IP5  0  0  FULL   0 0 FULL       Ambulates without assistance, normal gait  Neck: Full ROM, no tenderness,supple   Back- no tenderness. Eyes:  PERRL, extra ocular movements intact, conjunctiva normal   HEENT:  Atraumatic, normocephalic, external ears normal, oropharynx moist, no pharyngeal exudates. Respiratory:  No respiratory distress  GI:  Soft, nondistended, normal bowel sounds, nontender, noorganomegaly, no mass, no rebound, no guarding   :  No costovertebral angle tenderness   Integument:  Well hydrated, no rash or telangiectasias  Lymphatic:  No lymphadenopathy noted   Neurologic:   Alert & oriented x 3, CN 2-12 normal, no focal deficits noted. Sensations Intact. Muscle strength 5/5 proximally and distally in upper and lower extremities.    Psychiatric:  Speech and behavior appropriate           LABS AND IMAGING  Outside data reviewed and in HPI    Lab Results   Component Value Date/Time    WBC 10.1 06/23/2022 08:56 AM    RBC 4.35 06/23/2022 08:56 AM    HGB 13.6 06/23/2022 08:56 AM    HCT 41.4 06/23/2022 08:56 AM     06/23/2022 08:56 AM    MCV 95.2 06/23/2022 08:56 AM    MCH 31.3 06/23/2022 08:56 AM    MCHC 32.8 06/23/2022 08:56 AM    RDW 14.9 06/23/2022 08:56 AM    SEGSPCT 44.7 03/19/2013 10:33 AM    LYMPHOPCT 22.2 06/23/2022 08:56 AM    MONOPCT 8.3 06/23/2022 08:56 AM    EOSPCT 6.1 10/26/2011 04:55 AM    BASOPCT 0.4 06/23/2022 08:56 AM    MONOSABS 0.8 06/23/2022 08:56 AM    LYMPHSABS 2.2 06/23/2022 08:56 AM    EOSABS 0.6 06/23/2022 08:56 AM    BASOSABS 0.0 06/23/2022 08:56 AM    DIFFTYPE Auto-K 03/19/2013 10:33 AM       Chemistry        Component Value Date/Time     08/25/2021 1113    K 4.5 08/25/2021 1113    K 4.1 01/20/2019 1401    CL 99 08/25/2021 1113    CO2 26 08/25/2021 1113    BUN 23 (H) 08/25/2021 1113    CREATININE 1.3 (H) 06/23/2022 0856        Component Value Date/Time    CALCIUM 10.4 08/25/2021 1113    ALKPHOS 84 08/25/2021 1113    AST 17 06/23/2022 0856    ALT 13 06/23/2022 0856    BILITOT <0.2 08/25/2021 1113          Lab Results   Component Value Date    SEDRATE 29 08/25/2021     Lab Results   Component Value Date    CRP 3.3 08/25/2021     Lab Results   Component Value Date/Time    RADU Negative 06/21/2012 10:15 AM     No results found for: RF, CCPABIGG  Lab Results   Component Value Date/Time    RADU Negative 06/21/2012 10:15 AM     No results found for: DSDNAG, DSDNAIGGIFA  No results found for: SSAROAB, SSALAAB  No results found for: SMAB, RNPAB  No results found for: CENTABIGG  No results found for: C3, C4, ACE  Lab Results   Component Value Date/Time    VITD25 62.4 11/19/2018 12:01 PM     No results found for: Alicia Bash  Lab Results   Component Value Date    JGOSGKET03 293 06/14/2012     Lab Results   Component Value Date    TSHFT4 2.37 08/25/2021    TSH 2.84 08/31/2020     Lab Results   Component Value Date    VITD25 62.4 11/19/2018     Data reviewed: April 2021    ESR 30  CRP 5.2  Creatinine 1.4  RADU negative  Bilateral hand and foot x-rays August 2021  1. Large osseous erosion in the left wrist triquetrum new from 01/28/2021. This is compatible with the given history of rheumatoid arthritis. 2. No evidence of inflammatory arthritis in the right hand or bilateral feet. 3. Mild degenerative changes of the bilateral hands and feet as above.           ######################################################################    I thank you for giving me theopportunity to participate in [facility] care.  If you have any questions or concerns please feel free to contact me. I look forward to following  Warden Dennison along with you. Electronically signed by: Iliana Tsai MD, MD, 7/11/2022 1:57 PM    Documentation was done using voice recognition dragon software. Every effort was made to ensure accuracy;however, inadvertent unintentional computerized transcription errors may be present.

## 2022-07-13 ENCOUNTER — TELEPHONE (OUTPATIENT)
Dept: ADMINISTRATIVE | Age: 77
End: 2022-07-13

## 2022-07-15 LAB
QUANTI TB GOLD PLUS: NEGATIVE
QUANTI TB1 MINUS NIL: 0 IU/ML (ref 0–0.34)
QUANTI TB2 MINUS NIL: 0 IU/ML (ref 0–0.34)
QUANTIFERON MITOGEN: 3.49 IU/ML
QUANTIFERON NIL: 0.02 IU/ML

## 2022-07-26 NOTE — TELEPHONE ENCOUNTER
Patient asking if there are any updates. Please let me know if there is someone else I should be asking.

## 2022-07-28 DIAGNOSIS — M05.79 RHEUMATOID ARTHRITIS INVOLVING MULTIPLE SITES WITH POSITIVE RHEUMATOID FACTOR (HCC): ICD-10-CM

## 2022-07-28 NOTE — TELEPHONE ENCOUNTER
ENBREL MINI 50MG/ML CRT 4  Per Marylene Bucy  We have submitted the prior authorization request to the insurance company and are waiting for the  determination. We will follow up with the insurance company and notify you regularly of its status.

## 2022-07-29 RX ORDER — LEFLUNOMIDE 20 MG/1
TABLET ORAL
Qty: 30 TABLET | Refills: 0 | Status: SHIPPED | OUTPATIENT
Start: 2022-07-29 | End: 2022-10-24

## 2022-08-03 NOTE — TELEPHONE ENCOUNTER
Patient called in stating insurance will only pay 5000 of the 7000 that it will cost for the enbrel injection, can we get this sent on to patient assistance please

## 2022-08-24 DIAGNOSIS — Z79.899 HIGH RISK MEDICATION USE: ICD-10-CM

## 2022-08-24 LAB
ALT SERPL-CCNC: 11 U/L (ref 10–40)
AST SERPL-CCNC: 18 U/L (ref 15–37)
BASOPHILS ABSOLUTE: 0 K/UL (ref 0–0.2)
BASOPHILS RELATIVE PERCENT: 0.6 %
CREAT SERPL-MCNC: 1.6 MG/DL (ref 0.6–1.2)
EOSINOPHILS ABSOLUTE: 0.4 K/UL (ref 0–0.6)
EOSINOPHILS RELATIVE PERCENT: 4.5 %
GFR AFRICAN AMERICAN: 38
GFR NON-AFRICAN AMERICAN: 31
HCT VFR BLD CALC: 38.7 % (ref 36–48)
HEMOGLOBIN: 12.8 G/DL (ref 12–16)
LYMPHOCYTES ABSOLUTE: 1.2 K/UL (ref 1–5.1)
LYMPHOCYTES RELATIVE PERCENT: 15.7 %
MCH RBC QN AUTO: 30.9 PG (ref 26–34)
MCHC RBC AUTO-ENTMCNC: 33.1 G/DL (ref 31–36)
MCV RBC AUTO: 93.3 FL (ref 80–100)
MONOCYTES ABSOLUTE: 0.5 K/UL (ref 0–1.3)
MONOCYTES RELATIVE PERCENT: 7 %
NEUTROPHILS ABSOLUTE: 5.7 K/UL (ref 1.7–7.7)
NEUTROPHILS RELATIVE PERCENT: 72.2 %
PDW BLD-RTO: 15.3 % (ref 12.4–15.4)
PLATELET # BLD: 212 K/UL (ref 135–450)
PMV BLD AUTO: 9.3 FL (ref 5–10.5)
RBC # BLD: 4.15 M/UL (ref 4–5.2)
WBC # BLD: 7.9 K/UL (ref 4–11)

## 2022-08-24 NOTE — TELEPHONE ENCOUNTER
Senderra update as of 8/12/22    The patient has requested assistance with the high copay on their prescription. There are no grants available  at this time so we have recommended the patient apply for assistance through the [facility] Patient  Assistance Program. We have mailed the patient an application or provided instructions on how to apply. We  will discontinue the prescription at this time.

## 2022-09-07 ENCOUNTER — OFFICE VISIT (OUTPATIENT)
Dept: RHEUMATOLOGY | Age: 77
End: 2022-09-07
Payer: MEDICARE

## 2022-09-07 VITALS — DIASTOLIC BLOOD PRESSURE: 80 MMHG | WEIGHT: 148 LBS | SYSTOLIC BLOOD PRESSURE: 122 MMHG | BODY MASS INDEX: 26.64 KG/M2

## 2022-09-07 DIAGNOSIS — M15.9 PRIMARY OSTEOARTHRITIS INVOLVING MULTIPLE JOINTS: ICD-10-CM

## 2022-09-07 DIAGNOSIS — Z79.899 HIGH RISK MEDICATION USE: ICD-10-CM

## 2022-09-07 DIAGNOSIS — M05.79 RHEUMATOID ARTHRITIS INVOLVING MULTIPLE SITES WITH POSITIVE RHEUMATOID FACTOR (HCC): Primary | ICD-10-CM

## 2022-09-07 PROCEDURE — 99214 OFFICE O/P EST MOD 30 MIN: CPT | Performed by: INTERNAL MEDICINE

## 2022-09-07 PROCEDURE — 1123F ACP DISCUSS/DSCN MKR DOCD: CPT | Performed by: INTERNAL MEDICINE

## 2022-09-07 NOTE — PROGRESS NOTES
2022  Patient Name: Jazmin Rodríguez  : 1945  Medical Record: [id number]      ASSESSMENT AND PLAN    Assessment/Plan:      ASSESSMENT:    1. Rheumatoid arthritis involving multiple sites with positive rheumatoid factor ([facility] [address]    2. Primary osteoarthritis involving multiple joints    3. High risk medication use        PLAN:     Suma Spaulding was seen today for follow-up. Diagnoses and all orders for this visit:    Rheumatoid arthritis involving multiple sites with positive rheumatoid factor (HCC)    Primary osteoarthritis involving multiple joints    High risk medication use     , anti-CCP negative, ESR 30, CRP 5.2  Left wrist x-ray with erosions in the triquetrum  Reports improvement with Enbrel  No active synovitis on joint exam  Continue Enbrel 50 mg subcu once a week [start date 2022]  Restart leflunomide 20 mg daily  Labs every 12 weeks  Recommend pneumonia. She is up-to-date with pneumonia Covid vaccine    TNF INHIBITORS can cause increased risk of TB reactivation, oppurtunistic infections, lupus like illness, rash, hypersensitivity reaction, infusion reactions, demyelinating disease and possible risk of malignancies and lung diseases and were explained to the patient    The patient indicates understanding of these issues and agrees with the plan. Return in about 3 months (around 2022). The risks and benefits of my recommendations, as well as other treatment options, benefits and side effects werediscussed with the patient. All questions were answered.        MEDICATIONS  Current Outpatient Medications   Medication Sig Dispense Refill    leflunomide (ARAVA) 20 MG tablet TAKE 1 TABLET BY MOUTH EVERY DAY 30 tablet 0    rOPINIRole (REQUIP) 4 MG tablet TAKE 1 TABLET BY MOUTH NIGHTLY AT BEDTIME      Etanercept (ENBREL MINI) 50 MG/ML SOCT Inject 50 mg into the skin once a week 4 each 5    amitriptyline (ELAVIL) 50 MG tablet       calcium carbonate 600 MG TABS tablet Take 1 tablet by mouth daily 90 tablet 3    tiZANidine (ZANAFLEX) 4 MG tablet Take 4 mg by mouth every 6 hours as needed      aspirin 81 MG tablet Take 81 mg by mouth daily      atorvastatin (LIPITOR) 20 MG tablet Take 20 mg by mouth daily      cetirizine (ZYRTEC) 10 MG tablet Take 10 mg by mouth daily      linaclotide (LINZESS) 145 MCG capsule Take 145 mcg by mouth every morning (before breakfast)      amLODIPine (NORVASC) 5 MG tablet Take 5 mg by mouth daily       Multiple Vitamins-Minerals (CENTRUM SILVER PO) Take by mouth daily       zolpidem (AMBIEN) 10 MG tablet TAKE ONE TABLET BY MOUTH EVERY NIGHT AT BEDTIME AS NEEDED FOR SLEEP 30 tablet 0    HYDROcodone-acetaminophen (NORCO)  MG per tablet One table every 6 hours as needed for pain. 100 tablet 0    Omega-3 350 MG CAPS Take 1 capsule by mouth daily. 30 capsule 3    Cholecalciferol (VITAMIN D3) 1000 UNITS TABS TAKE ONE TABLET BY MOUTH EVERY DAY 30 tablet 2     No current facility-administered medications for this visit. ALLERGIES  Allergies   Allergen Reactions    Morphine      40 yrs ago threw up after surgery don't know if it was morphine or the anesthesia         Comments  No specialty comments available. Background history:  Hiro Wyatt is a 68 y.o. female with past medical history of pulmonary embolism, anxiety, depression, chronic kidney disease, chronic pain, hypertension who is being seen for follow up evaluation of  joint pain. Symptoms started 1 year ago. It started in the left hand and spread to involve the right hand. She has been on daily basis without any significant living or aggravating factors. She describes her pain as aching 9 out of 10. She has swelling in the knuckles and wrists. She has morning stiffness for 2 hours but she has stiffness in the hands that can last all day long. She has difficulty opening doorknobs and jars. She is on Norco for chronic pain.   She is unable to take oral NSAIDs due to chronic kidney disease. She has also tried topical analgesics with minimal benefit. She denies family history of rheumatoid arthritis. She denies psoriasis, inflammatory bowel disease, inflammatory back pain, dactylitis, enthesitis, tenosynovitis or uveitis. She denies fever, weight loss or swollen glands. She had a work-up with PCP that showed , ESR 30 and CRP 5.2. Interim history: She presents for follow-up of rheumatoid arthritis, osteoarthritis. She started Enbrel 1 week ago. She went off of leflunomide when she started Enbrel. She reports significant improvement in joint pain, swelling and stiffness after starting Enbrel. She is off of prednisone. She denies any side effects with Enbrel and leflunomide. blood work showed positive RF [166], negative anti-CCP antibody with normal ESR and CRP. Hand and foot x-rays show changes consistent with degenerative arthritis and erosions in the left wrist triquetrum. She denies any recent fevers or infections. HPI  Review of Systems    REVIEW OF SYSTEMS: Positive for DVT, fatigue  Constitutional: No unanticipated weight loss or fevers. Integumentary: No rash, photosensitivity, malar rash, livedo reticularis, alopecia and Raynaud's symptoms, sclerodactyly, skin tightening  Eyes: negative for visual disturbance and persistent redness, discharge from eyes   ENT: - No tinnitus, loss of hearing, vertigo, or recurrent ear infections.  - No history of nasal/oral ulcers. - No history of dry eyes/dry mouth  Cardiovascular: No history of pericarditis, chest pain or murmur or palpitations  Respiratory: No shortness of breath, cough or history of interstitial lung disease. No history of pleurisy. No history of tuberculosis or atypical infections. Gastrointestinal: No history of heart burn, dysphagia or esophageal dysmotility. No change in bowel habits or any inflammatory bowel disease. Genitourinary: No history of renal disease, miscarriages.   Hematologic/Lymphatic: No abnormal bruising or bleeding,  swollen lymph nodes. Neurological: No history of headaches, seizure or focal weakness. No history of neuropathies, paresthesias or hyperesthesias, facial droop, diplopia  Psychiatric: No history of bipolar disease  Endocrine: Denies any polyuria, polydipsia and osteoporosis  Allergic/Immunologic: No nasal congestion or hives. I have reviewed patients Past medical History, Social History and Family History as mentioned in her chart and this remains unchanged fromprevious.     Past Medical History:   Diagnosis Date    Acute sinusitis 1/3/2014    Anemia     Back pain, acute 1/3/2014    Chest pain, non-cardiac 11/12/2013    CKD (chronic kidney disease) stage 3, GFR 30-59 ml/min ([facility])     Closed fracture of fourth thoracic vertebra ([facility])     COPD (chronic obstructive pulmonary disease) ([facility] [address] 3/5/2012    Counseling NOS 5/2/2013    Depression with anxiety     DVT (deep venous thrombosis) ([facility])     Fracture of 5th metatarsal 9/6/2013    History of pulmonary embolism 6/13/2012    Hx of blood clots     Hypercholesteremia     Hypertension     Hypomagnesemia 6/13/2012    Increased MCV 6/13/2012    Insomnia secondary to situational depression 12/12/2012    Left sided abdominal pain 11/13/2013    Lumbar strain 3/27/2013    Muscle spasm     chronic    PE (pulmonary thromboembolism) 10/25/2011    Pulmonary embolism ([facility] [address]     Trigger finger 11/12/2013     Past Surgical History:   Procedure Laterality Date    ABDOMEN SURGERY      tummy tuck    ABDOMINOPLASTY  9/1/2011    ANKLE FRACTURE SURGERY Left 10-9/2015    BREAST ENHANCEMENT SURGERY      9/1/2011    CARPAL TUNNEL RELEASE      R    COLONOSCOPY N/A 10/9/2019    COLONOSCOPY POLYPECTOMY SNARE/COLD BIOPSY performed by Maritza Tang MD at [address] [facility]  10/9/2019    COLONOSCOPY WITH BIOPSY performed by Maritza Tang MD at [facility] 19/2015    HYSTERECTOMY (CERVIX STATUS UNKNOWN) total-pre cancerous cells    KNEE SURGERY      R    LARYNGOSCOPY N/A 2020    DIRECT LARYNGOSCOPY AND EXCISION OF THE NODULE ON THE LEFT EPIGLOTTIS AND EXCISION  OF  CYST OF RIGHT EPIGLOTTIS ([id number] or [id number] or [id number] or [phone]) performed by Ben Marie MD at [facility] History     Socioeconomic History    Marital status:       Spouse name: Not on file    Number of children: Not on file    Years of education: Not on file    Highest education level: Not on file   Occupational History    Not on file   Tobacco Use    Smoking status: Former     Packs/day: 0.00     Years: 15.00     Pack years: 0.00     Types: Cigarettes     Quit date: 10/9/2012     Years since quittin.9    Smokeless tobacco: Never   Vaping Use    Vaping Use: Never used   Substance and Sexual Activity    Alcohol use: No     Alcohol/week: 20.0 standard drinks     Types: 24 Standard drinks or equivalent per week     Comment: [address] 10/10/2012    Drug use: No    Sexual activity: Not on file   Other Topics Concern    Not on file   Social History Narrative    Not on file     Social Determinants of Health     Financial Resource Strain: Not on file   Food Insecurity: Not on file   Transportation Needs: Not on file   Physical Activity: Not on file   Stress: Not on file   Social Connections: Not on file   Intimate Partner Violence: Not on file   Housing Stability: Not on file     Family History   Problem Relation Age of Onset    Cancer Mother         pancrease     Stroke Father     Stroke Brother     Heart Disease Neg Hx          PHYSICAL EXAM   Vitals:    22 1155   BP: 122/80   Site: Right Upper Arm   Position: Sitting   Cuff Size: Medium Adult   Weight: 148 lb (67.1 kg)     Physical Exam  Constitutional:  Well developed, well nourished, no acute distress, non-toxic appearance   Musculoskeletal:    RIGHT  Swell  Tender  ROM  LEFT  Swell  Tender  ROM    DIP2  0  0  FULL   0  0  FULL    DIP3  0  0 FULL   0  0  FULL    DIP4  0  0  FULL   0  0  FULL    DIP5  0  0  FULL   0  0  FULL    PIP1  0  0  FULL   0  0  FULL    PIP2  0 0 FULL   0 0 FULL    PIP3  0 0 FULL   0 0 FULL    PIP4  0 0 FULL   0  0  FULL    PIP5  0 0 FULL   0  0  FULL    MCP1  0 0 FULL   0 0 FULL    MCP2  0 0 FULL   0 0 FULL    MCP3  0 0 FULL   0 0 FULL    MCP4  0  0  FULL   0  0  FULL    MCP5  0  0  FULL   0  0  FULL    Wrist  0 0 FULL   + 0 FULL    Elbow  0  0  FULL   0  0  FULL    Shouldr  0  0  FULL   0  0  FULL    Hip  0  0  FULL   0  0  FULL    Knee  0  0   crepitus/varus  0  0   crepitus/varus   Ankle  0  0  FULL   0 0 FULL    MTP1  0  0  FULL   0  0  FULL    MTP2  0 0 FULL   0 0 FULL    MTP3  0 0 FULL   0 0 FULL    MTP4  0 0 FULL   0 0 FULL    MTP5  0 0 FULL   0 0 FULL    IP1  0  0  FULL   0  0  FULL    IP2  0  0  FULL   0  0  FULL    IP3  0  0  FULL   0  0  FULL    IP4  0  0  FULL   0  0  FULL    IP5  0  0  FULL   0 0 FULL       Ambulates without assistance, normal gait  Neck: Full ROM, no tenderness,supple   Back- no tenderness. Eyes:  PERRL, extra ocular movements intact, conjunctiva normal   HEENT:  Atraumatic, normocephalic, external ears normal, oropharynx moist, no pharyngeal exudates. Respiratory:  No respiratory distress  GI:  Soft, nondistended, normal bowel sounds, nontender, noorganomegaly, no mass, no rebound, no guarding   :  No costovertebral angle tenderness   Integument:  Well hydrated, no rash or telangiectasias  Lymphatic:  No lymphadenopathy noted   Neurologic:   Alert & oriented x 3, CN 2-12 normal, no focal deficits noted. Sensations Intact. Muscle strength 5/5 proximally and distally in upper and lower extremities.    Psychiatric:  Speech and behavior appropriate           LABS AND IMAGING  Outside data reviewed and in HPI    Lab Results   Component Value Date/Time    WBC 7.9 08/24/2022 10:42 AM    RBC 4.15 08/24/2022 10:42 AM    HGB 12.8 08/24/2022 10:42 AM    HCT 38.7 08/24/2022 10:42 AM     08/24/2022 10:42 AM    MCV 93.3 08/24/2022 10:42 AM    MCH 30.9 08/24/2022 10:42 AM    MCHC 33.1 08/24/2022 10:42 AM    RDW 15.3 08/24/2022 10:42 AM    SEGSPCT 44.7 03/19/2013 10:33 AM    LYMPHOPCT 15.7 08/24/2022 10:42 AM    MONOPCT 7.0 08/24/2022 10:42 AM    EOSPCT 6.1 10/26/2011 04:55 AM    BASOPCT 0.6 08/24/2022 10:42 AM    MONOSABS 0.5 08/24/2022 10:42 AM    LYMPHSABS 1.2 08/24/2022 10:42 AM    EOSABS 0.4 08/24/2022 10:42 AM    BASOSABS 0.0 08/24/2022 10:42 AM    DIFFTYPE Auto-K 03/19/2013 10:33 AM       Chemistry        Component Value Date/Time     08/25/2021 1113    K 4.5 08/25/2021 1113    K 4.1 01/20/2019 1401    CL 99 08/25/2021 1113    CO2 26 08/25/2021 1113    BUN 23 (H) 08/25/2021 1113    CREATININE 1.6 (H) 08/24/2022 1042        Component Value Date/Time    CALCIUM 10.4 08/25/2021 1113    ALKPHOS 84 08/25/2021 1113    AST 18 08/24/2022 1042    ALT 11 08/24/2022 1042    BILITOT <0.2 08/25/2021 1113          Lab Results   Component Value Date    SEDRATE 29 08/25/2021     Lab Results   Component Value Date    CRP 3.3 08/25/2021     Lab Results   Component Value Date/Time    RADU Negative 06/21/2012 10:15 AM     No results found for: RF, CCPABIGG  Lab Results   Component Value Date/Time    RADU Negative 06/21/2012 10:15 AM     No results found for: DSDNAG, DSDNAIGGIFA  No results found for: Mana Tati  No results found for: SMAB, RNPAB  No results found for: CENTABIGG  No results found for: C3, C4, ACE  Lab Results   Component Value Date/Time    VITD25 62.4 11/19/2018 12:01 PM     No results found for: Roro Carrero  Lab Results   Component Value Date    UHCZBYVB16 293 06/14/2012     Lab Results   Component Value Date    TSHFT4 2.37 08/25/2021    TSH 2.84 08/31/2020     Lab Results   Component Value Date    VITD25 62.4 11/19/2018     Data reviewed: April 2021    ESR 30  CRP 5.2  Creatinine 1.4  RADU negative  Bilateral hand and foot x-rays August 2021  1.  Large osseous erosion in the left wrist triquetrum new from 01/28/2021. This is compatible with the given history of rheumatoid arthritis. 2. No evidence of inflammatory arthritis in the right hand or bilateral feet. 3. Mild degenerative changes of the bilateral hands and feet as above. ######################################################################    I thank you for giving me theopportunity to participate in [facility] care. If you have any questions or concerns please feel free to contact me. I look forward to following  Charles Terrell along with you. Electronically signed by: Ric Hinojosa MD, MD, 9/7/2022 12:07 PM    Documentation was done using voice recognition dragon software. Every effort was made to ensure accuracy;however, inadvertent unintentional computerized transcription errors may be present.

## 2022-09-12 ENCOUNTER — TELEPHONE (OUTPATIENT)
Dept: RHEUMATOLOGY | Age: 77
End: 2022-09-12

## 2022-09-12 NOTE — TELEPHONE ENCOUNTER
Patient called stating that she has had 2 injections on Enbrel. Her firsdt injection, she had no issues. She did her second injection on Saturday. She woke up this am with injection site redness,itching and warm to the touch. Area is about the size of a half dollar. Has not tried anything for the reaction.

## 2022-09-15 ENCOUNTER — OFFICE VISIT (OUTPATIENT)
Dept: ORTHOPEDIC SURGERY | Age: 77
End: 2022-09-15
Payer: MEDICARE

## 2022-09-15 VITALS — RESPIRATION RATE: 16 BRPM | HEIGHT: 63 IN | BODY MASS INDEX: 25.52 KG/M2 | WEIGHT: 144 LBS

## 2022-09-15 DIAGNOSIS — M54.50 LUMBAR SPINE PAIN: ICD-10-CM

## 2022-09-15 DIAGNOSIS — M25.551 RIGHT HIP PAIN: Primary | ICD-10-CM

## 2022-09-15 PROCEDURE — 1123F ACP DISCUSS/DSCN MKR DOCD: CPT | Performed by: STUDENT IN AN ORGANIZED HEALTH CARE EDUCATION/TRAINING PROGRAM

## 2022-09-15 PROCEDURE — 99203 OFFICE O/P NEW LOW 30 MIN: CPT | Performed by: STUDENT IN AN ORGANIZED HEALTH CARE EDUCATION/TRAINING PROGRAM

## 2022-09-15 NOTE — PROGRESS NOTES
CHIEF COMPLAINT: Right hip pain. History:   Fauzia Bailey is a 68 y.o. female who presents today for evaluation and treatment of right hip pain / injury. Patient was self-referred. This is evaluated as a personal injury. She states that the pain began 4  weeks ago. Patient rates the pain as a 10/10. Patient did have a history of injury. She fell on her left side onto her hip and buttocks. She is having pain in the right groin with hip flexion and certain movements. Patient does have a history of back pain. Patient has not had PT. The patient has not taken NSAIDs. The patient has not had an intra-articular hip injection. Outside reports reviewed: none.       Past Medical History:   Diagnosis Date    Acute sinusitis 1/3/2014    Anemia     Back pain, acute 1/3/2014    Chest pain, non-cardiac 11/12/2013    CKD (chronic kidney disease) stage 3, GFR 30-59 ml/min ([facility])     Closed fracture of fourth thoracic vertebra ([facility])     COPD (chronic obstructive pulmonary disease) ([facility] [address] 3/5/2012    Counseling NOS 5/2/2013    Depression with anxiety     DVT (deep venous thrombosis) ([address]     Fracture of 5th metatarsal 9/6/2013    History of pulmonary embolism 6/13/2012    Hx of blood clots     Hypercholesteremia     Hypertension     Hypomagnesemia 6/13/2012    Increased MCV 6/13/2012    Insomnia secondary to situational depression 12/12/2012    Left sided abdominal pain 11/13/2013    Lumbar strain 3/27/2013    Muscle spasm     chronic    PE (pulmonary thromboembolism) 10/25/2011    Pulmonary embolism ([facility] [address]     Trigger finger 11/12/2013       Past Surgical History:   Procedure Laterality Date    ABDOMEN SURGERY      tummy tuck    ABDOMINOPLASTY  9/1/2011    ANKLE FRACTURE SURGERY Left 10-9/2015    BREAST ENHANCEMENT SURGERY      9/1/2011    CARPAL TUNNEL RELEASE      R    COLONOSCOPY N/A 10/9/2019    COLONOSCOPY POLYPECTOMY SNARE/COLD BIOPSY performed by Irene Adkins MD at [place] [address] 10/9/2019    COLONOSCOPY WITH BIOPSY performed by Abilio Vogt MD at [facility] [address]      Aug     HYSTERECTOMY (CERVIX STATUS UNKNOWN)      total-pre cancerous cells    KNEE SURGERY      R    LARYNGOSCOPY N/A 2020    DIRECT LARYNGOSCOPY AND EXCISION OF THE NODULE ON THE LEFT EPIGLOTTIS AND EXCISION  OF  CYST OF RIGHT EPIGLOTTIS ([id number] or [phone] or [id number] or [phone]) performed by Hilda Robles MD at [facility] [address]         Family History   Problem Relation Age of Onset    Cancer Mother         pancrease     Stroke Father     Stroke Brother     Heart Disease Neg Hx        Social History     Socioeconomic History    Marital status:       Spouse name: None    Number of children: None    Years of education: None    Highest education level: None   Tobacco Use    Smoking status: Former     Packs/day: 0.00     Years: 15.00     Pack years: 0.00     Types: Cigarettes     Quit date: 10/9/2012     Years since quittin.9    Smokeless tobacco: Never   Vaping Use    Vaping Use: Never used   Substance and Sexual Activity    Alcohol use: No     Alcohol/week: 20.0 standard drinks     Types: 24 Standard drinks or equivalent per week     Comment: STATES QUIT DRINKING 10/10/2012    Drug use: No       Current Outpatient Medications   Medication Sig Dispense Refill    leflunomide (ARAVA) 20 MG tablet TAKE 1 TABLET BY MOUTH EVERY DAY 30 tablet 0    rOPINIRole (REQUIP) 4 MG tablet TAKE 1 TABLET BY MOUTH NIGHTLY AT BEDTIME      Etanercept (ENBREL MINI) 50 MG/ML SOCT Inject 50 mg into the skin once a week 4 each 5    amitriptyline (ELAVIL) 50 MG tablet       calcium carbonate 600 MG TABS tablet Take 1 tablet by mouth daily 90 tablet 3    tiZANidine (ZANAFLEX) 4 MG tablet Take 4 mg by mouth every 6 hours as needed      aspirin 81 MG tablet Take 81 mg by mouth daily      atorvastatin (LIPITOR) 20 MG tablet Take 20 mg by mouth daily      cetirizine (ZYRTEC) 10 MG tablet Take 10 mg by mouth daily      linaclotide (LINZESS) 145 MCG capsule Take 145 mcg by mouth every morning (before breakfast)      amLODIPine (NORVASC) 5 MG tablet Take 5 mg by mouth daily       Multiple Vitamins-Minerals (CENTRUM SILVER PO) Take by mouth daily       zolpidem (AMBIEN) 10 MG tablet TAKE ONE TABLET BY MOUTH EVERY NIGHT AT BEDTIME AS NEEDED FOR SLEEP 30 tablet 0    HYDROcodone-acetaminophen (NORCO)  MG per tablet One table every 6 hours as needed for pain. 100 tablet 0    Omega-3 350 MG CAPS Take 1 capsule by mouth daily. 30 capsule 3    Cholecalciferol (VITAMIN D3) 1000 UNITS TABS TAKE ONE TABLET BY MOUTH EVERY DAY 30 tablet 2     No current facility-administered medications for this visit. Allergies   Allergen Reactions    Morphine      40 yrs ago threw up after surgery don't know if it was morphine or the anesthesia        Review of Systems:  I have reviewed the clinically relevant past medical history, medications, allergies, family history, social history, and 13 point Review of Systems from the patient's recent history form & documented any details relevant to today's presenting complaints in the history above. The patient's self-reported past medical history, medications, allergies, family history, social history, and Review of Systems form from 9/15/22 have been scanned into the chart under the \"Media\" tab. Physical Examination:     Vital signs:     Vitals:    09/15/22 1327   Resp: 16   Weight: 144 lb (65.3 kg)   Height: 5' 2.5\" (1.588 m)        General:  alert, appears stated age, cooperative, and no distress   Gait:  Normal. The patient can bear weight on the injured extremity.      Right Hip  Trendelenburg test:  positive on Right   negative on Left      Passive ROM:  0 degrees extension    90 degrees flexion   45 degrees external rotation   10 degrees internal rotation   Left hip: 0 degrees extension, 100 flexion, 45 degrees external rotation, 15 degrees internal rotation   Impingement test: negative   Left hip: negative   Labral stress test:  negative   Left hip: negative   Skip test:  negative   Greater trochanter tenderness:  positive   Pirifiormis / Gluteus medius tenderness:  negative   Iliopsoas strength:  4 / 5    Left hip: 5/5   Logroll:  negative   Straight-leg raise:  Negative - back pain    Leg length discrepancy:  Equal      There is significant pain with palpation of the right pubis bone. Motor exam noted and recorded, quadriceps, hamstrings, foot dorsiflexors and plantar flexors are intact 5/5 equal and symmetric. Sensation is intact grossly to light touch in the tibial, peroneal, sural, and saphenous nerve distributions bilaterally. Both feet are well perfused and sensory is intact to feet equal and symmetric. There is not  any cellulitis, skin lesions, or lymphedema noted bilaterally. Imaging:  Right hip xrays:   AP pelvis xray and AP and lateral views obtained and reviewed. They demonstrate no evidence of acute fracture, subluxation, or dislocation. I am concerned for occult pubis fracture. There is mild right joint space narrowing. AP and lateral lumbar spine films taken today in the office: FINDINGS:  No evidence of fracture in the lumbar spine or the adjacent osseous  structures. Normal curvature. Bilateral lower lumbar spine facet  degenerative changes are present, mild to moderate. Disc space narrowing at L4-5. Assessment:     Right hip  possible occult pubis fracture   Lumbar DDD    Plan:     I do have suspicion for a right pubis fracture given the pain with palpation and active hip flexion. We discussed that I can order a CT of her pelvis to rule out an occult pubis fracture, however this would not change my treatment recommendation. She defers at this time. Recommend use of her walker to maintain non weightbearing status for the right leg for the next two weeks. Tylenol as needed. Follow up in 2 weeks.  If pain is worse, we will order the

## 2022-09-30 ENCOUNTER — OFFICE VISIT (OUTPATIENT)
Dept: ORTHOPEDIC SURGERY | Age: 77
End: 2022-09-30
Payer: MEDICARE

## 2022-09-30 VITALS — HEIGHT: 63 IN | BODY MASS INDEX: 25.69 KG/M2 | RESPIRATION RATE: 16 BRPM | WEIGHT: 145 LBS

## 2022-09-30 DIAGNOSIS — M54.16 LUMBAR RADICULITIS: Primary | ICD-10-CM

## 2022-09-30 PROCEDURE — 1123F ACP DISCUSS/DSCN MKR DOCD: CPT | Performed by: STUDENT IN AN ORGANIZED HEALTH CARE EDUCATION/TRAINING PROGRAM

## 2022-09-30 PROCEDURE — 99214 OFFICE O/P EST MOD 30 MIN: CPT | Performed by: STUDENT IN AN ORGANIZED HEALTH CARE EDUCATION/TRAINING PROGRAM

## 2022-09-30 RX ORDER — METHYLPREDNISOLONE 4 MG/1
TABLET ORAL
Qty: 1 KIT | Refills: 0 | Status: SHIPPED | OUTPATIENT
Start: 2022-09-30 | End: 2022-10-06

## 2022-09-30 NOTE — PROGRESS NOTES
CHIEF COMPLAINT: Right hip pain. Initial History:   Juan Ramon Murillo is a 68 y.o. female who presents today for evaluation and treatment of right hip pain / injury. Patient was self-referred. This is evaluated as a personal injury. She states that the pain began 4  weeks ago. Patient rates the pain as a 10/10. Patient did have a history of injury. She fell on her left side onto her hip and buttocks. She is having pain in the right groin with hip flexion and certain movements. Patient does have a history of back pain. Patient has not had PT. The patient has not taken NSAIDs. The patient has not had an intra-articular hip injection. Interval history: The patient has been using her walker and has continued to experience lower back pain with radiation to the anterior hip. She does still have some pain over the pubic bone as well. The radiating pain is worse than the pubic pain. It starts in the right side of the low back and radiates around the hip to the anterior hip. The pain is rated 7/10. It is worse with walking and transitioning. There is no neurogenic bowel or bladder or progressive weakness. Outside reports reviewed: none.       Past Medical History:   Diagnosis Date    Acute sinusitis 1/3/2014    Anemia     Back pain, acute 1/3/2014    Chest pain, non-cardiac 11/12/2013    CKD (chronic kidney disease) stage 3, GFR 30-59 ml/min ([facility])     Closed fracture of fourth thoracic vertebra (HCC)     COPD (chronic obstructive pulmonary disease) ([facility] [address] 3/5/2012    Counseling NOS 5/2/2013    Depression with anxiety     DVT (deep venous thrombosis) ([facility] [address]     Fracture of 5th metatarsal 9/6/2013    History of pulmonary embolism 6/13/2012    Hx of blood clots     Hypercholesteremia     Hypertension     Hypomagnesemia 6/13/2012    Increased MCV 6/13/2012    Insomnia secondary to situational depression 12/12/2012    Left sided abdominal pain 11/13/2013    Lumbar strain 3/27/2013    Muscle spasm     chronic PE (pulmonary thromboembolism) 10/25/2011    Pulmonary embolism (HCC)     Trigger finger 2013       Past Surgical History:   Procedure Laterality Date    ABDOMEN SURGERY      tummy tuck    ABDOMINOPLASTY  2011    ANKLE FRACTURE SURGERY Left     BREAST ENHANCEMENT SURGERY      2011    CARPAL TUNNEL RELEASE      R    COLONOSCOPY N/A 10/9/2019    COLONOSCOPY POLYPECTOMY SNARE/COLD BIOPSY performed by Jeremiah Echevarria MD at [address]  10/9/2019    COLONOSCOPY WITH BIOPSY performed by Jeremiah Echevarria MD at [address]      Aug     HYSTERECTOMY (CERVIX STATUS UNKNOWN)      total-pre cancerous cells    KNEE SURGERY      R    LARYNGOSCOPY N/A 2020    DIRECT LARYNGOSCOPY AND EXCISION OF THE NODULE ON THE LEFT EPIGLOTTIS AND EXCISION  OF  CYST OF RIGHT EPIGLOTTIS ([id number] or [phone] or [id number] or [phone]) performed by Cyndi Larry MD at [facility]         Family History   Problem Relation Age of Onset    Cancer Mother         pancrease     Stroke Father     Stroke Brother     Heart Disease Neg Hx        Social History     Socioeconomic History    Marital status:       Spouse name: None    Number of children: None    Years of education: None    Highest education level: None   Tobacco Use    Smoking status: Former     Packs/day: 0.00     Years: 15.00     Pack years: 0.00     Types: Cigarettes     Quit date: 10/9/2012     Years since quittin.9    Smokeless tobacco: Never   Vaping Use    Vaping Use: Never used   Substance and Sexual Activity    Alcohol use: No     Alcohol/week: 20.0 standard drinks     Types: 24 Standard drinks or equivalent per week     Comment: STATES QUIT DRINKING 10/10/2012    Drug use: No       Current Outpatient Medications   Medication Sig Dispense Refill    leflunomide (ARAVA) 20 MG tablet TAKE 1 TABLET BY MOUTH EVERY DAY 30 tablet 0    rOPINIRole (REQUIP) 4 MG tablet TAKE 1 TABLET BY MOUTH NIGHTLY AT BEDTIME      Etanercept (ENBREL MINI) 50 MG/ML SOCT Inject 50 mg into the skin once a week 4 each 5    amitriptyline (ELAVIL) 50 MG tablet       calcium carbonate 600 MG TABS tablet Take 1 tablet by mouth daily 90 tablet 3    tiZANidine (ZANAFLEX) 4 MG tablet Take 4 mg by mouth every 6 hours as needed      aspirin 81 MG tablet Take 81 mg by mouth daily      atorvastatin (LIPITOR) 20 MG tablet Take 20 mg by mouth daily      cetirizine (ZYRTEC) 10 MG tablet Take 10 mg by mouth daily      linaclotide (LINZESS) 145 MCG capsule Take 145 mcg by mouth every morning (before breakfast)      amLODIPine (NORVASC) 5 MG tablet Take 5 mg by mouth daily       Multiple Vitamins-Minerals (CENTRUM SILVER PO) Take by mouth daily       zolpidem (AMBIEN) 10 MG tablet TAKE ONE TABLET BY MOUTH EVERY NIGHT AT BEDTIME AS NEEDED FOR SLEEP 30 tablet 0    HYDROcodone-acetaminophen (NORCO)  MG per tablet One table every 6 hours as needed for pain. 100 tablet 0    Omega-3 350 MG CAPS Take 1 capsule by mouth daily. 30 capsule 3    Cholecalciferol (VITAMIN D3) 1000 UNITS TABS TAKE ONE TABLET BY MOUTH EVERY DAY 30 tablet 2     No current facility-administered medications for this visit. Allergies   Allergen Reactions    Morphine      40 yrs ago threw up after surgery don't know if it was morphine or the anesthesia        Review of Systems:  I have reviewed the clinically relevant past medical history, medications, allergies, family history, social history, and 13 point Review of Systems from the patient's recent history form & documented any details relevant to today's presenting complaints in the history above. The patient's self-reported past medical history, medications, allergies, family history, social history, and Review of Systems form from 9/15/22 have been scanned into the chart under the \"Media\" tab.       Physical Examination:     Vital signs:     Vitals:    09/30/22 0906   Resp: 16   Weight: 145 lb (65.8 kg)   Height: 5' occult pubis fracture   Lumbar Radiculitis   Lumbar DDD    Plan:     I would like to pursue a MR of the lumbar spine to evaluate for soft tissue pathology which may be contributing to radicular component of pain. Recommend continued use of her walker to maintain non weightbearing status for the right leg for the next two weeks. Medrol DP sent to pharmacy. Tylenol as needed. Follow up after MRI. Consider JENIFER at that time. Morena Snyder PA-C  Board Certified by the [employer] on Certification of [address] and [address]: This note was generated with use of a verbal recognition program and an attempt was made to check for errors. It is possible that there are still dictated errors within this office note. If so, please bring any significant errors to my attention for an addendum. All efforts were made to ensure that this office note is accurate.

## 2022-10-03 ENCOUNTER — TELEPHONE (OUTPATIENT)
Dept: ORTHOPEDIC SURGERY | Age: 77
End: 2022-10-03

## 2022-10-03 NOTE — TELEPHONE ENCOUNTER
S/W Roslyn Joshua  regarding MRI LSP approval and authorization being valid until 10/30/2022. Patient was instructed that their MRI needs to be scheduled at [facility]. The patient was instructed to contact the facility to schedule  at [phone]. A follow up appointment will need to be scheduled to review the results and treatment plan.

## 2022-10-22 DIAGNOSIS — M05.79 RHEUMATOID ARTHRITIS INVOLVING MULTIPLE SITES WITH POSITIVE RHEUMATOID FACTOR (HCC): ICD-10-CM

## 2022-10-24 RX ORDER — LEFLUNOMIDE 20 MG/1
TABLET ORAL
Qty: 30 TABLET | Refills: 2 | Status: SHIPPED | OUTPATIENT
Start: 2022-10-24

## 2022-11-01 DIAGNOSIS — Z79.899 HIGH RISK MEDICATION USE: ICD-10-CM

## 2022-11-01 LAB
ALT SERPL-CCNC: 11 U/L (ref 10–40)
AST SERPL-CCNC: 18 U/L (ref 15–37)
BASOPHILS ABSOLUTE: 0.1 K/UL (ref 0–0.2)
BASOPHILS RELATIVE PERCENT: 1.2 %
CREAT SERPL-MCNC: 1.2 MG/DL (ref 0.6–1.2)
EOSINOPHILS ABSOLUTE: 0.9 K/UL (ref 0–0.6)
EOSINOPHILS RELATIVE PERCENT: 17.6 %
GFR SERPL CREATININE-BSD FRML MDRD: 46 ML/MIN/{1.73_M2}
HCT VFR BLD CALC: 35.8 % (ref 36–48)
HEMOGLOBIN: 11.6 G/DL (ref 12–16)
LYMPHOCYTES ABSOLUTE: 1.5 K/UL (ref 1–5.1)
LYMPHOCYTES RELATIVE PERCENT: 29.7 %
MCH RBC QN AUTO: 30.5 PG (ref 26–34)
MCHC RBC AUTO-ENTMCNC: 32.4 G/DL (ref 31–36)
MCV RBC AUTO: 94.4 FL (ref 80–100)
MONOCYTES ABSOLUTE: 0.5 K/UL (ref 0–1.3)
MONOCYTES RELATIVE PERCENT: 10.3 %
NEUTROPHILS ABSOLUTE: 2 K/UL (ref 1.7–7.7)
NEUTROPHILS RELATIVE PERCENT: 41.2 %
PDW BLD-RTO: 16.4 % (ref 12.4–15.4)
PLATELET # BLD: 177 K/UL (ref 135–450)
PMV BLD AUTO: 9.8 FL (ref 5–10.5)
RBC # BLD: 3.79 M/UL (ref 4–5.2)
WBC # BLD: 4.9 K/UL (ref 4–11)

## 2022-11-02 DIAGNOSIS — D64.9 ANEMIA, UNSPECIFIED TYPE: Primary | ICD-10-CM

## 2022-11-03 DIAGNOSIS — D64.9 ANEMIA, UNSPECIFIED TYPE: ICD-10-CM

## 2022-11-03 LAB
FERRITIN: 146.4 NG/ML (ref 15–150)
FOLATE: >20 NG/ML (ref 4.78–24.2)
IRON: 95 UG/DL (ref 37–145)
TOTAL IRON BINDING CAPACITY: 301 UG/DL (ref 260–445)
VITAMIN B-12: 780 PG/ML (ref 211–911)

## 2022-11-29 DIAGNOSIS — Z79.899 HIGH RISK MEDICATION USE: ICD-10-CM

## 2022-11-29 LAB
ALT SERPL-CCNC: 16 U/L (ref 10–40)
AST SERPL-CCNC: 18 U/L (ref 15–37)
BASOPHILS ABSOLUTE: 0.1 K/UL (ref 0–0.2)
BASOPHILS RELATIVE PERCENT: 1.2 %
CREAT SERPL-MCNC: 1.3 MG/DL (ref 0.6–1.2)
EOSINOPHILS ABSOLUTE: 0.8 K/UL (ref 0–0.6)
EOSINOPHILS RELATIVE PERCENT: 14.5 %
GFR SERPL CREATININE-BSD FRML MDRD: 42 ML/MIN/{1.73_M2}
HCT VFR BLD CALC: 37.4 % (ref 36–48)
HEMOGLOBIN: 12.2 G/DL (ref 12–16)
LYMPHOCYTES ABSOLUTE: 1.8 K/UL (ref 1–5.1)
LYMPHOCYTES RELATIVE PERCENT: 32.5 %
MCH RBC QN AUTO: 31.1 PG (ref 26–34)
MCHC RBC AUTO-ENTMCNC: 32.6 G/DL (ref 31–36)
MCV RBC AUTO: 95.3 FL (ref 80–100)
MONOCYTES ABSOLUTE: 0.4 K/UL (ref 0–1.3)
MONOCYTES RELATIVE PERCENT: 7.9 %
NEUTROPHILS ABSOLUTE: 2.5 K/UL (ref 1.7–7.7)
NEUTROPHILS RELATIVE PERCENT: 43.9 %
PDW BLD-RTO: 15.1 % (ref 12.4–15.4)
PLATELET # BLD: 201 K/UL (ref 135–450)
PMV BLD AUTO: 10.4 FL (ref 5–10.5)
RBC # BLD: 3.93 M/UL (ref 4–5.2)
WBC # BLD: 5.7 K/UL (ref 4–11)

## 2022-12-06 ENCOUNTER — OFFICE VISIT (OUTPATIENT)
Dept: RHEUMATOLOGY | Age: 77
End: 2022-12-06
Payer: MEDICARE

## 2022-12-06 VITALS — WEIGHT: 143 LBS | SYSTOLIC BLOOD PRESSURE: 122 MMHG | DIASTOLIC BLOOD PRESSURE: 80 MMHG | BODY MASS INDEX: 25.74 KG/M2

## 2022-12-06 DIAGNOSIS — M15.9 PRIMARY OSTEOARTHRITIS INVOLVING MULTIPLE JOINTS: ICD-10-CM

## 2022-12-06 DIAGNOSIS — Z79.899 HIGH RISK MEDICATION USE: ICD-10-CM

## 2022-12-06 DIAGNOSIS — M05.79 RHEUMATOID ARTHRITIS INVOLVING MULTIPLE SITES WITH POSITIVE RHEUMATOID FACTOR (HCC): Primary | ICD-10-CM

## 2022-12-06 PROCEDURE — 3078F DIAST BP <80 MM HG: CPT | Performed by: INTERNAL MEDICINE

## 2022-12-06 PROCEDURE — 99214 OFFICE O/P EST MOD 30 MIN: CPT | Performed by: INTERNAL MEDICINE

## 2022-12-06 PROCEDURE — 3074F SYST BP LT 130 MM HG: CPT | Performed by: INTERNAL MEDICINE

## 2022-12-06 PROCEDURE — 1123F ACP DISCUSS/DSCN MKR DOCD: CPT | Performed by: INTERNAL MEDICINE

## 2022-12-06 NOTE — PROGRESS NOTES
2022  Patient Name: Michel Tafoya  : 1945  Medical Record: [id number]      ASSESSMENT AND PLAN    Assessment/Plan:      ASSESSMENT:    1. Rheumatoid arthritis involving multiple sites with positive rheumatoid factor ([facility] [address]    2. Primary osteoarthritis involving multiple joints    3. High risk medication use        PLAN:     Cherri Marie was seen today for follow-up. Diagnoses and all orders for this visit:    Rheumatoid arthritis involving multiple sites with positive rheumatoid factor (HCC)    Primary osteoarthritis involving multiple joints    High risk medication use     , anti-CCP negative, ESR 30, CRP 5.2  Left wrist x-ray with erosions in the triquetrum  Reports improvement with Enbrel  No active synovitis on joint exam  Continue Enbrel 50 mg subcu once a week [start date 2022]  Continue leflunomide 20 mg daily  Labs every 12 weeks  Recommend pneumonia. She is up-to-date with pneumonia Covid vaccine  No oral NSAIDs due to chronic kidney disease    TNF INHIBITORS can cause increased risk of TB reactivation, oppurtunistic infections, lupus like illness, rash, hypersensitivity reaction, infusion reactions, demyelinating disease and possible risk of malignancies and lung diseases and were explained to the patient    The patient indicates understanding of these issues and agrees with the plan. Return in about 3 months (around 3/6/2023). The risks and benefits of my recommendations, as well as other treatment options, benefits and side effects werediscussed with the patient. All questions were answered.        MEDICATIONS  Current Outpatient Medications   Medication Sig Dispense Refill    leflunomide (ARAVA) 20 MG tablet TAKE 1 TABLET BY MOUTH EVERY DAY 30 tablet 2    rOPINIRole (REQUIP) 4 MG tablet TAKE 1 TABLET BY MOUTH NIGHTLY AT BEDTIME      Etanercept (ENBREL MINI) 50 MG/ML SOCT Inject 50 mg into the skin once a week 4 each 5    amitriptyline (ELAVIL) 50 MG tablet calcium carbonate 600 MG TABS tablet Take 1 tablet by mouth daily 90 tablet 3    tiZANidine (ZANAFLEX) 4 MG tablet Take 4 mg by mouth every 6 hours as needed      aspirin 81 MG tablet Take 81 mg by mouth daily      atorvastatin (LIPITOR) 20 MG tablet Take 20 mg by mouth daily      cetirizine (ZYRTEC) 10 MG tablet Take 10 mg by mouth daily      linaclotide (LINZESS) 145 MCG capsule Take 145 mcg by mouth every morning (before breakfast)      amLODIPine (NORVASC) 5 MG tablet Take 5 mg by mouth daily       Multiple Vitamins-Minerals (CENTRUM SILVER PO) Take by mouth daily       zolpidem (AMBIEN) 10 MG tablet TAKE ONE TABLET BY MOUTH EVERY NIGHT AT BEDTIME AS NEEDED FOR SLEEP 30 tablet 0    HYDROcodone-acetaminophen (NORCO)  MG per tablet One table every 6 hours as needed for pain. 100 tablet 0    Omega-3 350 MG CAPS Take 1 capsule by mouth daily. 30 capsule 3    Cholecalciferol (VITAMIN D3) 1000 UNITS TABS TAKE ONE TABLET BY MOUTH EVERY DAY 30 tablet 2     No current facility-administered medications for this visit. ALLERGIES  Allergies   Allergen Reactions    Morphine      40 yrs ago threw up after surgery don't know if it was morphine or the anesthesia         Comments  No specialty comments available. Background history:  Kaylen Boswell is a 68 y.o. female with past medical history of pulmonary embolism, anxiety, depression, chronic kidney disease, chronic pain, hypertension who is being seen for follow up evaluation of  joint pain. Symptoms started 1 year ago. It started in the left hand and spread to involve the right hand. She has been on daily basis without any significant living or aggravating factors. She describes her pain as aching 9 out of 10. She has swelling in the knuckles and wrists. She has morning stiffness for 2 hours but she has stiffness in the hands that can last all day long. She has difficulty opening doorknobs and jars. She is on Norco for chronic pain.   She is unable to take oral NSAIDs due to chronic kidney disease. She has also tried topical analgesics with minimal benefit. She denies family history of rheumatoid arthritis. She denies psoriasis, inflammatory bowel disease, inflammatory back pain, dactylitis, enthesitis, tenosynovitis or uveitis. She denies fever, weight loss or swollen glands. She had a work-up with PCP that showed , ESR 30 and CRP 5.2. Interim history: She presents for follow-up of rheumatoid arthritis, osteoarthritis. She is on Enbrel 50 mg subcu once a week and leflunomide 20 mg daily. She reports significant improvement in joint pain, swelling and stiffness. She has chronic deformity in the left wrist.      She denies any side effects with Enbrel and leflunomide. blood work showed positive RF [166], negative anti-CCP antibody with normal ESR and CRP. Hand and foot x-rays show changes consistent with degenerative arthritis and erosions in the left wrist triquetrum. She denies any recent fevers or infections. HPI  Review of Systems    REVIEW OF SYSTEMS: Positive for DVT, fatigue  Constitutional: No unanticipated weight loss or fevers. Integumentary: No rash, photosensitivity, malar rash, livedo reticularis, alopecia and Raynaud's symptoms, sclerodactyly, skin tightening  Eyes: negative for visual disturbance and persistent redness, discharge from eyes   ENT: - No tinnitus, loss of hearing, vertigo, or recurrent ear infections.  - No history of nasal/oral ulcers. - No history of dry eyes/dry mouth  Cardiovascular: No history of pericarditis, chest pain or murmur or palpitations  Respiratory: No shortness of breath, cough or history of interstitial lung disease. No history of pleurisy. No history of tuberculosis or atypical infections. Gastrointestinal: No history of heart burn, dysphagia or esophageal dysmotility. No change in bowel habits or any inflammatory bowel disease.   Genitourinary: No history of renal disease, miscarriages. Hematologic/Lymphatic: No abnormal bruising or bleeding,  swollen lymph nodes. Neurological: No history of headaches, seizure or focal weakness. No history of neuropathies, paresthesias or hyperesthesias, facial droop, diplopia  Psychiatric: No history of bipolar disease  Endocrine: Denies any polyuria, polydipsia and osteoporosis  Allergic/Immunologic: No nasal congestion or hives. I have reviewed patients Past medical History, Social History and Family History as mentioned in her chart and this remains unchanged fromprevious.     Past Medical History:   Diagnosis Date    Acute sinusitis 1/3/2014    Anemia     Back pain, acute 1/3/2014    Chest pain, non-cardiac 11/12/2013    CKD (chronic kidney disease) stage 3, GFR 30-59 ml/min ([facility])     Closed fracture of fourth thoracic vertebra ([facility])     COPD (chronic obstructive pulmonary disease) ([facility] [address] 3/5/2012    Counseling NOS 5/2/2013    Depression with anxiety     DVT (deep venous thrombosis) ([facility])     Fracture of 5th metatarsal 9/6/2013    History of pulmonary embolism 6/13/2012    Hx of blood clots     Hypercholesteremia     Hypertension     Hypomagnesemia 6/13/2012    Increased MCV 6/13/2012    Insomnia secondary to situational depression 12/12/2012    Left sided abdominal pain 11/13/2013    Lumbar strain 3/27/2013    Muscle spasm     chronic    PE (pulmonary thromboembolism) 10/25/2011    Pulmonary embolism ([facility] [address]     Trigger finger 11/12/2013     Past Surgical History:   Procedure Laterality Date    ABDOMEN SURGERY      tummy tuck    ABDOMINOPLASTY  9/1/2011    ANKLE FRACTURE SURGERY Left 10-9/2015    BREAST ENHANCEMENT SURGERY      9/1/2011    CARPAL TUNNEL RELEASE      R    COLONOSCOPY N/A 10/9/2019    COLONOSCOPY POLYPECTOMY SNARE/COLD BIOPSY performed by Pascale Love MD at [facility]  10/9/2019    COLONOSCOPY WITH BIOPSY performed by Pascale Love MD at [address] [place] [address]      Aug 19/2015 HYSTERECTOMY (CERVIX STATUS UNKNOWN)      total-pre cancerous cells    KNEE SURGERY      R    LARYNGOSCOPY N/A 7/28/2020    DIRECT LARYNGOSCOPY AND EXCISION OF THE NODULE ON THE LEFT EPIGLOTTIS AND EXCISION  OF  CYST OF RIGHT EPIGLOTTIS ([id number] or [phone] or [id number] or [phone]) performed by Alessio Valdivia MD at [employer] [facility]     Socioeconomic History    Marital status:       Spouse name: Not on file    Number of children: Not on file    Years of education: Not on file    Highest education level: Not on file   Occupational History    Not on file   Tobacco Use    Smoking status: Former     Packs/day: 0.00     Years: 15.00     Pack years: 0.00     Types: Cigarettes     Quit date: 10/9/2012     Years since quitting: 10.1    Smokeless tobacco: Never   Vaping Use    Vaping Use: Never used   Substance and Sexual Activity    Alcohol use: No     Alcohol/week: 20.0 standard drinks     Types: 24 Standard drinks or equivalent per week     Comment: [address] 10/10/2012    Drug use: No    Sexual activity: Not on file   Other Topics Concern    Not on file   Social History Narrative    Not on file     Social Determinants of Health     Financial Resource Strain: Not on file   Food Insecurity: Not on file   Transportation Needs: Not on file   Physical Activity: Not on file   Stress: Not on file   Social Connections: Not on file   Intimate Partner Violence: Not on file   Housing Stability: Not on file     Family History   Problem Relation Age of Onset    Cancer Mother         pancrease     Stroke Father     Stroke Brother     Heart Disease Neg Hx          PHYSICAL EXAM   Vitals:    12/06/22 1016   BP: 122/80   Site: Right Upper Arm   Position: Sitting   Cuff Size: Large Adult   Weight: 143 lb (64.9 kg)     Physical Exam  Constitutional:  Well developed, well nourished, no acute distress, non-toxic appearance   Musculoskeletal:    RIGHT  Swell  Tender  ROM  LEFT  Swell  Tender  ROM DIP2  0  0  FULL   0  0  FULL    DIP3  0  0  FULL   0  0  FULL    DIP4  0  0  FULL   0  0  FULL    DIP5  0  0  FULL   0  0  FULL    PIP1  0  0  FULL   0  0  FULL    PIP2  0 0 FULL   0 0 FULL    PIP3  0 0 FULL   0 0 FULL    PIP4  0 0 FULL   0  0  FULL    PIP5  0 0 FULL   0  0  FULL    MCP1  0 0 FULL   0 0 FULL    MCP2  0 0 FULL   0 0 FULL    MCP3  0 0 FULL   0 0 FULL    MCP4  0  0  FULL   0  0  FULL    MCP5  0  0  FULL   0  0  FULL    Wrist  0 0 FULL   + 0 FULL    Elbow  0  0  FULL   0  0  FULL    Shouldr  0  0  FULL   0  0  FULL    Hip  0  0  FULL   0  0  FULL    Knee  0  0   crepitus/varus  0  0   crepitus/varus   Ankle  0  0  FULL   0 0 FULL    MTP1  0  0  FULL   0  0  FULL    MTP2  0 0 FULL   0 0 FULL    MTP3  0 0 FULL   0 0 FULL    MTP4  0 0 FULL   0 0 FULL    MTP5  0 0 FULL   0 0 FULL    IP1  0  0  FULL   0  0  FULL    IP2  0  0  FULL   0  0  FULL    IP3  0  0  FULL   0  0  FULL    IP4  0  0  FULL   0  0  FULL    IP5  0  0  FULL   0 0 FULL       Ambulates without assistance, normal gait  Neck: Full ROM, no tenderness,supple   Back- no tenderness. Eyes:  PERRL, extra ocular movements intact, conjunctiva normal   HEENT:  Atraumatic, normocephalic, external ears normal, oropharynx moist, no pharyngeal exudates. Respiratory:  No respiratory distress  GI:  Soft, nondistended, normal bowel sounds, nontender, noorganomegaly, no mass, no rebound, no guarding   :  No costovertebral angle tenderness   Integument:  Well hydrated, no rash or telangiectasias  Lymphatic:  No lymphadenopathy noted   Neurologic:   Alert & oriented x 3, CN 2-12 normal, no focal deficits noted. Sensations Intact. Muscle strength 5/5 proximally and distally in upper and lower extremities.    Psychiatric:  Speech and behavior appropriate           LABS AND IMAGING  Outside data reviewed and in HPI    Lab Results   Component Value Date/Time    WBC 5.7 11/29/2022 07:54 AM    RBC 3.93 11/29/2022 07:54 AM    HGB 12.2 11/29/2022 07:54 AM    HCT 37.4 11/29/2022 07:54 AM     11/29/2022 07:54 AM    MCV 95.3 11/29/2022 07:54 AM    MCH 31.1 11/29/2022 07:54 AM    MCHC 32.6 11/29/2022 07:54 AM    RDW 15.1 11/29/2022 07:54 AM    SEGSPCT 44.7 03/19/2013 10:33 AM    LYMPHOPCT 32.5 11/29/2022 07:54 AM    MONOPCT 7.9 11/29/2022 07:54 AM    EOSPCT 6.1 10/26/2011 04:55 AM    BASOPCT 1.2 11/29/2022 07:54 AM    MONOSABS 0.4 11/29/2022 07:54 AM    LYMPHSABS 1.8 11/29/2022 07:54 AM    EOSABS 0.8 11/29/2022 07:54 AM    BASOSABS 0.1 11/29/2022 07:54 AM    DIFFTYPE Auto-K 03/19/2013 10:33 AM       Chemistry        Component Value Date/Time     08/25/2021 1113    K 4.5 08/25/2021 1113    K 4.1 01/20/2019 1401    CL 99 08/25/2021 1113    CO2 26 08/25/2021 1113    BUN 23 (H) 08/25/2021 1113    CREATININE 1.3 (H) 11/29/2022 0754        Component Value Date/Time    CALCIUM 10.4 08/25/2021 1113    ALKPHOS 84 08/25/2021 1113    AST 18 11/29/2022 0754    ALT 16 11/29/2022 0754    BILITOT <0.2 08/25/2021 1113          Lab Results   Component Value Date    SEDRATE 29 08/25/2021     Lab Results   Component Value Date    CRP 3.3 08/25/2021     Lab Results   Component Value Date/Time    RADU Negative 06/21/2012 10:15 AM     No results found for: RF, CCPABIGG  Lab Results   Component Value Date/Time    RADU Negative 06/21/2012 10:15 AM     No results found for: DSDNAG, DSDNAIGGIFA  No results found for: SSAROAB, SSALAAB  No results found for: SMAB, RNPAB  No results found for: CENTABIGG  No results found for: C3, C4, ACE  Lab Results   Component Value Date/Time    VITD25 62.4 11/19/2018 12:01 PM     No results found for: Dio Payne  Lab Results   Component Value Date    LKEQTRYB27 780 11/03/2022     Lab Results   Component Value Date    TSHFT4 2.37 08/25/2021    TSH 2.84 08/31/2020     Lab Results   Component Value Date    VITD25 62.4 11/19/2018     Data reviewed: April 2021    ESR 30  CRP 5.2  Creatinine 1.4  RADU negative  Bilateral hand and foot x-rays August 2021  1. Large osseous erosion in the left wrist triquetrum new from 01/28/2021. This is compatible with the given history of rheumatoid arthritis. 2. No evidence of inflammatory arthritis in the right hand or bilateral feet. 3. Mild degenerative changes of the bilateral hands and feet as above. ######################################################################    I thank you for giving me theopportunity to participate in [facility] care. If you have any questions or concerns please feel free to contact me. I look forward to following  Baby Fill along with you. Electronically signed by: Jerel Panchal MD, MD, 12/6/2022 10:39 AM    Documentation was done using voice recognition dragon software. Every effort was made to ensure accuracy;however, inadvertent unintentional computerized transcription errors may be present.

## 2023-01-20 DIAGNOSIS — M05.79 RHEUMATOID ARTHRITIS INVOLVING MULTIPLE SITES WITH POSITIVE RHEUMATOID FACTOR (HCC): ICD-10-CM

## 2023-01-20 RX ORDER — LEFLUNOMIDE 20 MG/1
TABLET ORAL
Qty: 90 TABLET | Refills: 0 | Status: SHIPPED | OUTPATIENT
Start: 2023-01-20

## 2023-01-20 NOTE — TELEPHONE ENCOUNTER
LastVisit 12/6/2022   LastLabs  11/29/2022  NextVisit Visit date not found   LastRefilled 10/24/2022

## 2023-02-02 ENCOUNTER — TELEPHONE (OUTPATIENT)
Dept: RHEUMATOLOGY | Age: 78
End: 2023-02-02

## 2023-02-02 DIAGNOSIS — M05.79 RHEUMATOID ARTHRITIS INVOLVING MULTIPLE SITES WITH POSITIVE RHEUMATOID FACTOR (HCC): ICD-10-CM

## 2023-02-03 RX ORDER — ETANERCEPT 50 MG/ML
50 SOLUTION SUBCUTANEOUS WEEKLY
Qty: 4 EACH | Refills: 1 | Status: SHIPPED | OUTPATIENT
Start: 2023-02-03

## 2023-03-03 DIAGNOSIS — M05.79 RHEUMATOID ARTHRITIS INVOLVING MULTIPLE SITES WITH POSITIVE RHEUMATOID FACTOR (HCC): ICD-10-CM

## 2023-03-06 RX ORDER — LEFLUNOMIDE 20 MG/1
TABLET ORAL
Qty: 30 TABLET | Refills: 0 | Status: SHIPPED | OUTPATIENT
Start: 2023-03-06

## 2023-04-17 ENCOUNTER — HOSPITAL ENCOUNTER (OUTPATIENT)
Dept: PHYSICAL THERAPY | Age: 78
Setting detail: THERAPIES SERIES
Discharge: HOME | End: 2023-04-17
Payer: COMMERCIAL

## 2023-04-17 PROCEDURE — 97161 PT EVAL LOW COMPLEX 20 MIN: CPT

## 2023-04-17 PROCEDURE — 97530 THERAPEUTIC ACTIVITIES: CPT

## 2023-04-17 PROCEDURE — 97110 THERAPEUTIC EXERCISES: CPT

## 2023-04-17 NOTE — PLAN OF CARE
HEP.    HEP instruction: Written HEP instructions provided and reviewed. GOALS:  Patient stated goal: feel better   [] Progressing: [] Met: [] Not Met: [] Adjusted    Therapist goals for Patient:   Short Term Goals: To be achieved in: 1-2 weeks  1. Independent in HEP and progression per patient tolerance, in order to prevent re-injury. [] Progressing: [] Met: [] Not Met: [] Adjusted  2. Patient will have a decrease in pain to facilitate improvement in movement, function, and ADLs as indicated by Functional Deficits. [] Progressing: [] Met: [] Not Met: [] Adjusted    Long Term Goals: To be achieved in: 6 weeks  1. Pt will improve ANNIE by 10 points to reduce disability and progress towards PLOF. [] Progressing: [] Met: [] Not Met: [] Adjusted  2. Patient will demonstrate increased AROM to WNL, good LS mobility, good hip ROM to allow for proper joint functioning as indicated by patients Functional Deficits. [] Progressing: [] Met: [] Not Met: [] Adjusted  3. Patient will demonstrate an increase in Strength to 4/5 in BLE good proximal hip and core activation to allow for proper functional mobility as indicated by patients Functional Deficits. [] Progressing: [] Met: [] Not Met: [] Adjusted  4. Patient will return to functional activities including standing for up to 2 hours without increased symptoms or restriction to demonstrate improvements in household activities such as cleaning and cooking especially in the evening.    [] Progressing: [] Met: [] Not Met: [] Adjusted    Electronically signed by:  Pedro Meyers PT, DPT

## 2023-04-17 NOTE — FLOWSHEET NOTE
muscles ([id number]  [] Group:      [] Other:     Goals:   Patient stated goal: feel better   [] Progressing: [] Met: [] Not Met: [] Adjusted     Therapist goals for Patient:   Short Term Goals: To be achieved in: 1-2 weeks  1. Independent in HEP and progression per patient tolerance, in order to prevent re-injury. [] Progressing: [] Met: [] Not Met: [] Adjusted  2. Patient will have a decrease in pain to facilitate improvement in movement, function, and ADLs as indicated by Functional Deficits. [] Progressing: [] Met: [] Not Met: [] Adjusted     Long Term Goals: To be achieved in: 6 weeks  1. Pt will improve ANNIE by 10 points to reduce disability and progress towards PLOF. [] Progressing: [] Met: [] Not Met: [] Adjusted  2. Patient will demonstrate increased AROM to WNL, good LS mobility, good hip ROM to allow for proper joint functioning as indicated by patients Functional Deficits. [] Progressing: [] Met: [] Not Met: [] Adjusted  3. Patient will demonstrate an increase in Strength to 4/5 in BLE good proximal hip and core activation to allow for proper functional mobility as indicated by patients Functional Deficits. [] Progressing: [] Met: [] Not Met: [] Adjusted  4. Patient will return to functional activities including standing for up to 2 hours without increased symptoms or restriction to demonstrate improvements in household activities such as cleaning and cooking especially in the evening. [] Progressing: [] Met: [] Not Met: [] Adjusted)    Overall Progression Towards Functional goals/ Treatment Progress Update:  [] Patient is progressing as expected towards functional goals listed. [] Progression is slowed due to complexities/Impairments listed. [] Progression has been slowed due to co-morbidities.   [x] Plan just implemented, too soon to assess goals progression <30days   [] Goals require adjustment due to lack of progress  [] Patient is not progressing as expected and requires additional follow

## 2023-04-20 ENCOUNTER — OFFICE VISIT (OUTPATIENT)
Dept: RHEUMATOLOGY | Age: 78
End: 2023-04-20
Payer: COMMERCIAL

## 2023-04-20 VITALS — DIASTOLIC BLOOD PRESSURE: 78 MMHG | BODY MASS INDEX: 26.64 KG/M2 | SYSTOLIC BLOOD PRESSURE: 110 MMHG | WEIGHT: 148 LBS

## 2023-04-20 DIAGNOSIS — M15.9 PRIMARY OSTEOARTHRITIS INVOLVING MULTIPLE JOINTS: ICD-10-CM

## 2023-04-20 DIAGNOSIS — Z79.899 HIGH RISK MEDICATION USE: ICD-10-CM

## 2023-04-20 DIAGNOSIS — M05.79 RHEUMATOID ARTHRITIS INVOLVING MULTIPLE SITES WITH POSITIVE RHEUMATOID FACTOR (HCC): Primary | ICD-10-CM

## 2023-04-20 PROCEDURE — 3078F DIAST BP <80 MM HG: CPT | Performed by: INTERNAL MEDICINE

## 2023-04-20 PROCEDURE — 1123F ACP DISCUSS/DSCN MKR DOCD: CPT | Performed by: INTERNAL MEDICINE

## 2023-04-20 PROCEDURE — 99214 OFFICE O/P EST MOD 30 MIN: CPT | Performed by: INTERNAL MEDICINE

## 2023-04-20 PROCEDURE — 3074F SYST BP LT 130 MM HG: CPT | Performed by: INTERNAL MEDICINE

## 2023-04-20 RX ORDER — ETANERCEPT 50 MG/ML
50 SOLUTION SUBCUTANEOUS WEEKLY
Qty: 4 EACH | Refills: 5 | Status: SHIPPED | OUTPATIENT
Start: 2023-04-20

## 2023-04-20 NOTE — PROGRESS NOTES
2023  Patient Name: Mila Madden  : 1945  Medical Record: [id number]      ASSESSMENT AND PLAN    Assessment/Plan:      ASSESSMENT:    1. Rheumatoid arthritis involving multiple sites with positive rheumatoid factor ([facility] [address]    2. Primary osteoarthritis involving multiple joints    3. High risk medication use        PLAN:     Lalit Lockett was seen today for follow-up. Diagnoses and all orders for this visit:    Rheumatoid arthritis involving multiple sites with positive rheumatoid factor (HCC)  -     Etanercept (ENBREL MINI) 50 MG/ML SOCT; Inject 50 mg into the skin once a week    Primary osteoarthritis involving multiple joints    High risk medication use       , anti-CCP negative, ESR 30, CRP 5.2  Left wrist x-ray with erosions in the triquetrum  Reports improvement with Enbrel  No active synovitis on joint exam  Continue Enbrel 50 mg subcu once a week [start date 2022]  Continue leflunomide 20 mg daily  Labs every 12 weeks  Recommend pneumonia. She is up-to-date with pneumonia Covid vaccine  No oral NSAIDs due to chronic kidney disease    TNF INHIBITORS can cause increased risk of TB reactivation, oppurtunistic infections, lupus like illness, rash, hypersensitivity reaction, infusion reactions, demyelinating disease and possible risk of malignancies and lung diseases and were explained to the patient    The patient indicates understanding of these issues and agrees with the plan. Return in about 3 months (around 2023). The risks and benefits of my recommendations, as well as other treatment options, benefits and side effects werediscussed with the patient. All questions were answered.        MEDICATIONS  Current Outpatient Medications   Medication Sig Dispense Refill    Etanercept (ENBREL MINI) 50 MG/ML SOCT Inject 50 mg into the skin once a week 4 each 5    leflunomide (ARAVA) 20 MG tablet TAKE 1 TABLET BY MOUTH EVERY DAY 30 tablet 0    rOPINIRole (REQUIP) 4 MG

## 2023-04-24 ENCOUNTER — TELEPHONE (OUTPATIENT)
Dept: RHEUMATOLOGY | Age: 78
End: 2023-04-24

## 2023-04-24 ENCOUNTER — HOSPITAL ENCOUNTER (OUTPATIENT)
Dept: PHYSICAL THERAPY | Age: 78
Setting detail: THERAPIES SERIES
Discharge: HOME | End: 2023-04-24
Payer: COMMERCIAL

## 2023-04-24 PROCEDURE — 97112 NEUROMUSCULAR REEDUCATION: CPT

## 2023-04-24 PROCEDURE — 97110 THERAPEUTIC EXERCISES: CPT

## 2023-04-24 NOTE — FLOWSHEET NOTE
[] Continue per plan of care [] Alter current plan (see comments)  [x] Plan of care initiated [] Hold pending MD visit [] Discharge    Electronically signed by: Mary De Luna PT, DPT      Note: If patient does not return for scheduled/ recommended follow up visits, this note will serve as a discharge from care along with most recent update on progress.

## 2023-04-28 ENCOUNTER — TELEPHONE (OUTPATIENT)
Dept: RHEUMATOLOGY | Age: 78
End: 2023-04-28

## 2023-05-01 ENCOUNTER — HOSPITAL ENCOUNTER (OUTPATIENT)
Dept: PHYSICAL THERAPY | Age: 78
Setting detail: THERAPIES SERIES
Discharge: HOME | End: 2023-05-01
Payer: MEDICARE

## 2023-05-01 PROCEDURE — 97140 MANUAL THERAPY 1/> REGIONS: CPT

## 2023-05-01 PROCEDURE — 97110 THERAPEUTIC EXERCISES: CPT

## 2023-05-01 PROCEDURE — 97112 NEUROMUSCULAR REEDUCATION: CPT

## 2023-05-01 NOTE — FLOWSHEET NOTE
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.24]  Treatment Diagnosis: impaired posture, dec BLE strength, dec functional mobility, dec thoracic and lumbar mobility , dec BLE strength   Insurance/Certification information:  PT Insurance Information: [employer] (no auth, BMN, $40 copay)  Physician Information:  Mariah Mena MD    Plan of care signed (Y/N): []  Yes [x]  No     Date of Patient follow up with Physician:      Progress Report: []  Yes  [x]  No     Date Range for reporting period:  Beginnin2023  Ending:     Progress report due (10 Rx/or 30 days whichever is less): visit #6 or 3/64/33    Recertification due (POC duration/ or 90 days whichever is less): visit #12     Visit # Insurance Allowable Auth required? Date Range   3/12 [employer] (no auth, BMN, $40 copay) []  Yes  [x]  No          Latex Allergy:  [x]NO      []YES  Preferred Language for Healthcare:   [x]English       []other:    Functional Scale:                                                      Date assessed:  ANNIE: raw score = 11; dysfunction = 22%                23    Pain level: 0/10  but can get to about 9/10 in thoracic area    SUBJECTIVE:  Doing well, was able to clean cabinets over the weekend with her friend. No back pain. Able to do her HEP.  Has a little bit of discomfort in her L UT.    OBJECTIVE: See eval  Observation:   Test measurements:      RESTRICTIONS/PRECAUTIONS:  RA, HTN, COPD      Treatment based classification:    [] mobilization/manipulation   [] stabilization   [] extension based   [] flexion based   [] lateral shift   [] traction   [] unspecified Components:   [] thoracolumbar   [] pelvic   [] SIJ   [] sacral   [] hip         Comparable sign:

## 2023-05-08 ENCOUNTER — HOSPITAL ENCOUNTER (OUTPATIENT)
Dept: PHYSICAL THERAPY | Age: 78
Setting detail: THERAPIES SERIES
Discharge: HOME | End: 2023-05-08
Payer: MEDICARE

## 2023-05-08 PROCEDURE — 97110 THERAPEUTIC EXERCISES: CPT

## 2023-05-08 PROCEDURE — 97112 NEUROMUSCULAR REEDUCATION: CPT

## 2023-05-08 NOTE — FLOWSHEET NOTE
[address]  Phone: [phone]   Fax: [phone]    Physical Therapy Daily Treatment Note    Date:  2023     Patient Name:  Rebekah Weston    :  1945  MRN: [id number]  Medical Diagnosis:  Other intervertebral disc displacement, thoracic region [M51.24]  Treatment Diagnosis: impaired posture, dec BLE strength, dec functional mobility, dec thoracic and lumbar mobility , dec BLE strength   Insurance/Certification information:  PT Insurance Information: Aetna (no auth, BMN, $40 copay)  Physician Information:  Ailyn Akbar MD    Plan of care signed (Y/N): []  Yes [x]  No     Date of Patient follow up with Physician:      Progress Report: []  Yes  [x]  No     Date Range for reporting period:  Beginnin2023  Ending:     Progress report due (10 Rx/or 30 days whichever is less): visit #6 or 3/39/82    Recertification due (POC duration/ or 90 days whichever is less): visit #12     Visit # Insurance Allowable Auth required? Date Range    A[employer]na (no auth, BMN, $40 copay) []  Yes  [x]  No          Latex Allergy:  [x]NO      []YES  Preferred Language for Healthcare:   [x]English       []other:    Functional Scale:                                                      Date assessed:  ANNIE: raw score = 11; dysfunction = 22%                23    Pain level: 0/10  but can get to about 9/10 in thoracic area    SUBJECTIVE:  Doing well. Had family over the weekend.      OBJECTIVE: See eval  Observation:   Test measurements:      RESTRICTIONS/PRECAUTIONS:  RA, HTN, COPD      Treatment based classification:    [] mobilization/manipulation   [] stabilization   [] extension based   [] flexion based   [] lateral shift   [] traction   [] unspecified Components:   [] thoracolumbar   [] pelvic   [] SIJ   [] sacral   [] hip         Comparable sign:     Exercises/Interventions:     Therapeutic Exercise (45159) Resistance / level Sets / Seconds Reps Notes /

## 2023-05-10 DIAGNOSIS — M05.79 RHEUMATOID ARTHRITIS INVOLVING MULTIPLE SITES WITH POSITIVE RHEUMATOID FACTOR (HCC): ICD-10-CM

## 2023-05-11 RX ORDER — LEFLUNOMIDE 20 MG/1
TABLET ORAL
Qty: 30 TABLET | Refills: 2 | Status: SHIPPED | OUTPATIENT
Start: 2023-05-11

## 2023-05-15 ENCOUNTER — APPOINTMENT (OUTPATIENT)
Dept: PHYSICAL THERAPY | Age: 78
End: 2023-05-15
Payer: MEDICARE

## 2023-05-22 ENCOUNTER — APPOINTMENT (OUTPATIENT)
Dept: PHYSICAL THERAPY | Age: 78
End: 2023-05-22
Payer: MEDICARE

## 2023-05-31 ENCOUNTER — APPOINTMENT (OUTPATIENT)
Dept: PHYSICAL THERAPY | Age: 78
End: 2023-05-31
Payer: MEDICARE

## 2023-07-03 ENCOUNTER — TELEPHONE (OUTPATIENT)
Dept: RHEUMATOLOGY | Age: 78
End: 2023-07-03

## 2023-07-03 DIAGNOSIS — M05.79 RHEUMATOID ARTHRITIS INVOLVING MULTIPLE SITES WITH POSITIVE RHEUMATOID FACTOR (HCC): ICD-10-CM

## 2023-07-03 RX ORDER — ETANERCEPT 50 MG/ML
50 SOLUTION SUBCUTANEOUS WEEKLY
Qty: 4 EACH | Refills: 0 | OUTPATIENT
Start: 2023-07-03

## 2023-07-03 NOTE — TELEPHONE ENCOUNTER
Patient called the office stating that she is out of refills with [employer] Patient Assistance. Reports she has a from that needs signed for her to get a refill. She has 3 shots left and will not be able to see  until 8/8/23. She is asking to get the forms signed. Advised that Job Yeny will be starting at the new location and I will not be able to get this signed here. She reports she runs out of drug before then. She reports the office can call in a script.    [facility]# [phone]  or [phone]    Appt scheduled 8/8/23

## 2023-07-08 DIAGNOSIS — M05.79 RHEUMATOID ARTHRITIS INVOLVING MULTIPLE SITES WITH POSITIVE RHEUMATOID FACTOR (HCC): ICD-10-CM

## 2023-07-10 RX ORDER — LEFLUNOMIDE 20 MG/1
TABLET ORAL
Qty: 90 TABLET | OUTPATIENT
Start: 2023-07-10

## 2023-07-10 NOTE — TELEPHONE ENCOUNTER
is no longer with [facility] ([facility]). She will be starting her new location at end of July. Unable to authorize refills at this time. For urgent refills, reach out to PCP to see if a short supply can be authorized until Dr. Bhavani Villavicencio is available at her new location.

## 2023-09-12 ENCOUNTER — TELEPHONE (OUTPATIENT)
Dept: INTERNAL MEDICINE CLINIC | Age: 78
End: 2023-09-12

## 2023-09-12 NOTE — TELEPHONE ENCOUNTER
[facility] called and stated that a specialist office faxed a referral. She is not a patient here. I let them know to check the provider and give them a call.

## 2023-11-15 RX ORDER — MAGNESIUM GLUCONATE 27 MG(500)
500 TABLET ORAL
COMMUNITY

## 2023-11-15 RX ORDER — GABAPENTIN 100 MG/1
200 CAPSULE ORAL 2 TIMES DAILY
COMMUNITY

## 2023-11-15 NOTE — PROGRESS NOTES
Patient _X__ reached   _____not reached-preop instructions left on voice mail_____________    11/21/23______ TIME___0920_____ARRIVAL___8720  FEC______      Nothing to eat or drink after midnight the night before,except for what the prep instructions call for. If you do not have the instructions or do not understand them please contact your doctors office. Follow any instructions your doctors office has given you including what medications to take the AM of your procedure and which ones to hold. You may use your inhalers - bring rescue inhalers with you DOS. If you take a long acting insulin the jeremias prior please cut the dose in half and take no diabetic medications that AM.Follow specific doctors office instructions regarding blood thinners and if they want you to hold and for how long. If you are on a blood thinner and have no instructions please contact the office and ask. Dress comfortably,bring your insurance card,picture ID,and a complete list of medications, including supplements. You must have a responsible adult to stay with you during the procedure,drive you home and stay with you. [facility] phone number [phone] for any questions. OTHER INSTRUCTIONS(if applicable)___take amlodipine am of procedure______________________________________________________        VISITOR POLICY(subject to change)    Current visitor policy is 2 visitors per patient. No children allowed. Mask at discretion of facility. Visiting hours are 8a-8p. Overnight visitors will be at the discretion of the nurse. All policies are subject to change.

## 2023-11-21 ENCOUNTER — ANESTHESIA EVENT (OUTPATIENT)
Dept: ENDOSCOPY | Age: 78
End: 2023-11-21
Payer: MEDICARE

## 2023-11-21 ENCOUNTER — HOSPITAL ENCOUNTER (OUTPATIENT)
Age: 78
Setting detail: OUTPATIENT SURGERY
Discharge: HOME | End: 2023-11-21
Attending: INTERNAL MEDICINE | Admitting: INTERNAL MEDICINE
Payer: MEDICARE

## 2023-11-21 ENCOUNTER — ANESTHESIA (OUTPATIENT)
Dept: ENDOSCOPY | Age: 78
End: 2023-11-21
Payer: MEDICARE

## 2023-11-21 VITALS
WEIGHT: 142 LBS | SYSTOLIC BLOOD PRESSURE: 156 MMHG | BODY MASS INDEX: 25.16 KG/M2 | TEMPERATURE: 98.6 F | RESPIRATION RATE: 16 BRPM | OXYGEN SATURATION: 96 % | HEIGHT: 63 IN | DIASTOLIC BLOOD PRESSURE: 88 MMHG | HEART RATE: 76 BPM

## 2023-11-21 DIAGNOSIS — Z12.11 COLON CANCER SCREENING: ICD-10-CM

## 2023-11-21 PROCEDURE — 6360000002 HC RX W HCPCS: Performed by: NURSE ANESTHETIST, CERTIFIED REGISTERED

## 2023-11-21 PROCEDURE — 7100000011 HC PHASE II RECOVERY - ADDTL 15 MIN: Performed by: INTERNAL MEDICINE

## 2023-11-21 PROCEDURE — 2500000003 HC RX 250 WO HCPCS: Performed by: NURSE ANESTHETIST, CERTIFIED REGISTERED

## 2023-11-21 PROCEDURE — 2709999900 HC NON-CHARGEABLE SUPPLY: Performed by: INTERNAL MEDICINE

## 2023-11-21 PROCEDURE — 3700000001 HC ADD 15 MINUTES (ANESTHESIA): Performed by: INTERNAL MEDICINE

## 2023-11-21 PROCEDURE — 3609010600 HC COLONOSCOPY POLYPECTOMY SNARE/COLD BIOPSY: Performed by: INTERNAL MEDICINE

## 2023-11-21 PROCEDURE — 3700000000 HC ANESTHESIA ATTENDED CARE: Performed by: INTERNAL MEDICINE

## 2023-11-21 PROCEDURE — 7100000010 HC PHASE II RECOVERY - FIRST 15 MIN: Performed by: INTERNAL MEDICINE

## 2023-11-21 PROCEDURE — 2580000003 HC RX 258: Performed by: INTERNAL MEDICINE

## 2023-11-21 PROCEDURE — 88305 TISSUE EXAM BY PATHOLOGIST: CPT

## 2023-11-21 RX ORDER — LIDOCAINE HYDROCHLORIDE 20 MG/ML
INJECTION, SOLUTION INFILTRATION; PERINEURAL PRN
Status: DISCONTINUED | OUTPATIENT
Start: 2023-11-21 | End: 2023-11-21 | Stop reason: SDUPTHER

## 2023-11-21 RX ORDER — SODIUM CHLORIDE 9 MG/ML
INJECTION, SOLUTION INTRAVENOUS CONTINUOUS
Status: DISCONTINUED | OUTPATIENT
Start: 2023-11-21 | End: 2023-11-21 | Stop reason: HOSPADM

## 2023-11-21 RX ORDER — PROPOFOL 10 MG/ML
INJECTION, EMULSION INTRAVENOUS PRN
Status: DISCONTINUED | OUTPATIENT
Start: 2023-11-21 | End: 2023-11-21 | Stop reason: SDUPTHER

## 2023-11-21 RX ADMIN — PROPOFOL 50 MG: 10 INJECTION, EMULSION INTRAVENOUS at 09:42

## 2023-11-21 RX ADMIN — PROPOFOL 50 MG: 10 INJECTION, EMULSION INTRAVENOUS at 09:53

## 2023-11-21 RX ADMIN — SODIUM CHLORIDE: 9 INJECTION, SOLUTION INTRAVENOUS at 08:34

## 2023-11-21 RX ADMIN — PHENYLEPHRINE HYDROCHLORIDE 100 MCG: 10 INJECTION INTRAVENOUS at 10:08

## 2023-11-21 RX ADMIN — LIDOCAINE HYDROCHLORIDE 100 MG: 20 INJECTION, SOLUTION INFILTRATION; PERINEURAL at 09:42

## 2023-11-21 RX ADMIN — PROPOFOL 50 MG: 10 INJECTION, EMULSION INTRAVENOUS at 09:47

## 2023-11-21 RX ADMIN — PHENYLEPHRINE HYDROCHLORIDE 100 MCG: 10 INJECTION INTRAVENOUS at 09:59

## 2023-11-21 ASSESSMENT — PAIN - FUNCTIONAL ASSESSMENT
PAIN_FUNCTIONAL_ASSESSMENT: NONE - DENIES PAIN
PAIN_FUNCTIONAL_ASSESSMENT: 0-10
PAIN_FUNCTIONAL_ASSESSMENT: NONE - DENIES PAIN

## 2023-11-21 ASSESSMENT — PAIN SCALES - GENERAL
PAINLEVEL_OUTOF10: 0

## 2023-11-21 ASSESSMENT — COPD QUESTIONNAIRES: CAT_SEVERITY: MODERATE

## 2023-11-21 NOTE — ANESTHESIA POSTPROCEDURE EVALUATION
Department of Anesthesiology  Postprocedure Note    Patient: Flor Jaeger  MRN: [id number]  YOB: 1945  Date of evaluation: 11/21/2023      Procedure Summary     Date: 11/21/23 Room / Location: [address] [place] [address]    Anesthesia Start: 7134 Anesthesia Stop: 2016    Procedure: COLONOSCOPY POLYPECTOMY SNARE/COLD BIOPSY Diagnosis:       Colon cancer screening      (Colon cancer screening [Z12.11])    Surgeons: Rachna Banks MD Responsible Provider: Michi Live MD    Anesthesia Type: general, MAC ASA Status: 3          Anesthesia Type: No value filed.     Matilda Phase I: Matilda Score: 10    Matilda Phase II:        Anesthesia Post Evaluation

## 2023-11-21 NOTE — H&P
Gastroenterology Note                 Pre-operative History and Physical    Patient: Kathleen Husbands  : 1945  CSN:     History Obtained From:   Patient or guardian. HISTORY OF PRESENT ILLNESS:    The patient is a 66 y.o. female here for Endoscopy.       Past Medical History:    Past Medical History:   Diagnosis Date    Acute sinusitis 1/3/2014    Anemia     Back pain, acute 1/3/2014    Chest pain, non-cardiac 2013    CKD (chronic kidney disease) stage 3, GFR 30-59 ml/min ([facility])     Closed fracture of fourth thoracic vertebra (HCC)     COPD (chronic obstructive pulmonary disease) ([address]) 3/5/2012    Counseling NOS 2013    Depression with anxiety     DVT (deep venous thrombosis) ([facility])     Fracture of 5th metatarsal 2013    History of pulmonary embolism 2012    Hx of blood clots     Hypercholesteremia     Hypertension     Hypomagnesemia 2012    Increased MCV 2012    Insomnia secondary to situational depression 2012    Left sided abdominal pain 2013    Lumbar strain 3/27/2013    Muscle spasm     chronic    PE (pulmonary thromboembolism) 10/25/2011    Pulmonary embolism ([address])     Trigger finger 2013     Past Surgical History:    Past Surgical History:   Procedure Laterality Date    ABDOMEN SURGERY      tummy tuck    ABDOMINOPLASTY  2011    ANKLE FRACTURE SURGERY Left     BREAST ENHANCEMENT SURGERY      2011    CARPAL TUNNEL RELEASE      R    COLONOSCOPY N/A 10/9/2019    COLONOSCOPY POLYPECTOMY SNARE/COLD BIOPSY performed by Floresita Calvert MD at [facility]  10/9/2019    COLONOSCOPY WITH BIOPSY performed by Floresita Calvert MD at [address] [place] [address]      Aug     HYSTERECTOMY (CERVIX STATUS UNKNOWN)      total-pre cancerous cells    KNEE SURGERY      R    LARYNGOSCOPY N/A 2020    DIRECT LARYNGOSCOPY AND EXCISION OF THE NODULE ON THE LEFT EPIGLOTTIS AND EXCISION  OF  CYST OF RIGHT

## 2024-05-03 ENCOUNTER — HOSPITAL ENCOUNTER (OUTPATIENT)
Age: 79
Discharge: HOME | End: 2024-05-03
Payer: MEDICARE

## 2024-05-03 ENCOUNTER — HOSPITAL ENCOUNTER (OUTPATIENT)
Dept: GENERAL RADIOLOGY | Age: 79
Discharge: HOME | End: 2024-05-03
Payer: MEDICARE

## 2024-05-03 DIAGNOSIS — R52 PAIN: ICD-10-CM

## 2024-05-03 PROCEDURE — 73502 X-RAY EXAM HIP UNI 2-3 VIEWS: CPT

## 2025-01-08 ENCOUNTER — HOSPITAL ENCOUNTER (OUTPATIENT)
Dept: GENERAL RADIOLOGY | Age: 80
Discharge: HOME | End: 2025-01-08
Payer: MEDICARE

## 2025-01-08 ENCOUNTER — HOSPITAL ENCOUNTER (OUTPATIENT)
Age: 80
Discharge: HOME | End: 2025-01-08
Payer: MEDICARE

## 2025-01-08 DIAGNOSIS — R07.81 PLEURITIC CHEST PAIN: ICD-10-CM

## 2025-01-08 PROCEDURE — 71046 X-RAY EXAM CHEST 2 VIEWS: CPT

## (undated) DEVICE — MEDI-VAC NON-CONDUCTIVE SUCTION TUBING: Brand: CARDINAL HEALTH

## (undated) DEVICE — TRAP SPEC RETRV CLR PLAS POLYP IN LN SUCT QUIK CTCH

## (undated) DEVICE — VALVE SUCTION AIR H2O SET ORCA POD + DISP

## (undated) DEVICE — BW-412T DISP COMBO CLEANING BRUSH: Brand: SINGLE USE COMBINATION CLEANING BRUSH

## (undated) DEVICE — GOWN AURORA NONREINF LG: Brand: MEDLINE INDUSTRIES, INC.

## (undated) DEVICE — SOLUTION IV IRRIG 500ML 0.9% SODIUM CHL 2F7123

## (undated) DEVICE — MEDICINE CUP, GRADUATED, STER: Brand: MEDLINE

## (undated) DEVICE — GUARD TOOTH SM

## (undated) DEVICE — FORCEPS BX L240CM WRK CHN 2.8MM STD CAP W/ NDL MIC MESH

## (undated) DEVICE — TOWEL,OR,DSP,ST,BLUE,STD,4/PK,20PK/CS: Brand: MEDLINE

## (undated) DEVICE — ENDOSCOPIC KIT 6X3/16 FT COLON W/ 1.1 OZ 2 GWN W/O BRSH

## (undated) DEVICE — BOWL MED L 32OZ PLAS W/ MOLD GRAD EZ OPN PEEL PCH

## (undated) DEVICE — YANKAUER,BULB TIP,W/O VENT,RIGID,STERILE: Brand: MEDLINE

## (undated) DEVICE — MASC TURNOVER KIT: Brand: MEDLINE INDUSTRIES, INC.

## (undated) DEVICE — SET VLV 3 PC AWS DISPOSABLE GRDIAN SCOPEVALET

## (undated) DEVICE — PROCEDURE KIT ENDOSCP CUST

## (undated) DEVICE — SNARES COLD OVAL 10MM THIN

## (undated) DEVICE — SHEET,DRAPE,53X77,STERILE: Brand: MEDLINE

## (undated) DEVICE — GLOVE ORANGE PI 8   MSG9080

## (undated) DEVICE — GAUZE,SPONGE,4"X4",16PLY,XRAY,STRL,LF: Brand: MEDLINE

## (undated) DEVICE — CODMAN® SURGICAL PATTIES 1/2" X 3" (1.27CM X 7.62CM): Brand: CODMAN®

## (undated) DEVICE — SKIN MARKER,REGULAR TIP WITH RULER: Brand: DEVON

## (undated) DEVICE — SOLUTION IV IRRIG WATER 500ML POUR BRL ST 2F7113

## (undated) DEVICE — Device: Brand: DISPOSABLE ELECTROSURGICAL SNARE

## (undated) DEVICE — ELECTRODE PT RET AD L9FT HI MOIST COND ADH HYDRGEL CORDED

## (undated) DEVICE — AIR/WATER CLEANING ADAPTER FOR OLYMPUS® GI ENDOSCOPE: Brand: BULLDOG®